# Patient Record
Sex: FEMALE | Race: BLACK OR AFRICAN AMERICAN | NOT HISPANIC OR LATINO | Employment: OTHER | URBAN - METROPOLITAN AREA
[De-identification: names, ages, dates, MRNs, and addresses within clinical notes are randomized per-mention and may not be internally consistent; named-entity substitution may affect disease eponyms.]

---

## 2017-01-26 ENCOUNTER — HOSPITAL ENCOUNTER (OUTPATIENT)
Dept: GENERAL RADIOLOGY | Facility: HOSPITAL | Age: 62
Discharge: HOME | End: 2017-01-26
Attending: FAMILY MEDICINE | Admitting: FAMILY MEDICINE

## 2017-01-26 ENCOUNTER — TRANSCRIBE ORDERS (OUTPATIENT)
Dept: ADMINISTRATIVE | Facility: HOSPITAL | Age: 62
End: 2017-01-26

## 2017-01-26 DIAGNOSIS — Z87.09 HISTORY OF PNEUMOTHORAX: Primary | ICD-10-CM

## 2017-01-26 DIAGNOSIS — Z87.09 HISTORY OF PNEUMOTHORAX: ICD-10-CM

## 2017-01-26 PROCEDURE — 71020 HC CHEST PA AND LATERAL: CPT

## 2017-04-25 ENCOUNTER — OFFICE VISIT (OUTPATIENT)
Dept: CARDIOLOGY | Facility: CLINIC | Age: 62
End: 2017-04-25

## 2017-04-25 VITALS
HEIGHT: 65 IN | DIASTOLIC BLOOD PRESSURE: 82 MMHG | BODY MASS INDEX: 26.59 KG/M2 | WEIGHT: 159.6 LBS | SYSTOLIC BLOOD PRESSURE: 142 MMHG | HEART RATE: 89 BPM

## 2017-04-25 DIAGNOSIS — Z95.0 PACEMAKER: Primary | ICD-10-CM

## 2017-04-25 PROCEDURE — 93288 INTERROG EVL PM/LDLS PM IP: CPT | Performed by: INTERNAL MEDICINE

## 2017-04-25 NOTE — PROGRESS NOTES
"              Electrophysiology PM Check         No current outpatient prescriptions on file.     /82 (BP Location: Right arm, Patient Position: Sitting)  Pulse 89  Ht 65\" (165.1 cm)  Wt 159 lb 9.6 oz (72.4 kg)  LMP  (LMP Unknown)  BMI 26.56 kg/m2.    Physical Exam   Pulmonary/Chest:              Company BTK  Mode DDD  Lower Rate 50 bpm  Upper rate 140 bpm       Thresholds  % pacing 0  Atrial Pacing 0.5 Volts @ 0.4 ms  Atrial Sensing 1.7 mV  Atrial Impedence 487 Ohms    % pacing 100  Right Ventricular Pacing 0.6 Volts @ 0.4 ms  Right Ventricular Sensing DEPENDENT mV  Right Ventricular Impedence 507 Ohms      Battery Voltage 75% Volts  Longevity 9.5y        Episodes NS AT    Reprogramming 0                           Comments       NORMAL FUNCTION, FOLLOW-UP 1 YEAR      "

## 2017-09-01 ENCOUNTER — TRANSCRIBE ORDERS (OUTPATIENT)
Dept: ADMINISTRATIVE | Facility: HOSPITAL | Age: 62
End: 2017-09-01

## 2017-09-01 DIAGNOSIS — Z12.31 VISIT FOR SCREENING MAMMOGRAM: Primary | ICD-10-CM

## 2017-09-05 ENCOUNTER — HOSPITAL ENCOUNTER (OUTPATIENT)
Dept: MAMMOGRAPHY | Facility: HOSPITAL | Age: 62
Discharge: HOME | End: 2017-09-05
Attending: FAMILY MEDICINE | Admitting: FAMILY MEDICINE

## 2017-09-05 DIAGNOSIS — Z12.31 VISIT FOR SCREENING MAMMOGRAM: ICD-10-CM

## 2017-09-05 PROCEDURE — 77063 BREAST TOMOSYNTHESIS BI: CPT | Performed by: RADIOLOGY

## 2017-09-05 PROCEDURE — 77067 SCR MAMMO BI INCL CAD: CPT | Performed by: RADIOLOGY

## 2017-09-05 PROCEDURE — 77063 BREAST TOMOSYNTHESIS BI: CPT

## 2017-09-05 PROCEDURE — G0202 SCR MAMMO BI INCL CAD: HCPCS

## 2018-02-12 ENCOUNTER — HOSPITAL ENCOUNTER (OUTPATIENT)
Dept: GENERAL RADIOLOGY | Facility: HOSPITAL | Age: 63
Discharge: HOME | End: 2018-02-12
Attending: FAMILY MEDICINE | Admitting: FAMILY MEDICINE

## 2018-02-12 ENCOUNTER — TRANSCRIBE ORDERS (OUTPATIENT)
Dept: ADMINISTRATIVE | Facility: HOSPITAL | Age: 63
End: 2018-02-12

## 2018-02-12 DIAGNOSIS — S93.401A MODERATE RIGHT ANKLE SPRAIN, INITIAL ENCOUNTER: ICD-10-CM

## 2018-02-12 DIAGNOSIS — S93.401A MODERATE RIGHT ANKLE SPRAIN, INITIAL ENCOUNTER: Primary | ICD-10-CM

## 2018-02-12 PROCEDURE — 73610 X-RAY EXAM OF ANKLE: CPT

## 2018-05-01 ENCOUNTER — CLINICAL SUPPORT NO REQUIREMENTS (OUTPATIENT)
Dept: CARDIOLOGY | Facility: CLINIC | Age: 63
End: 2018-05-01

## 2018-05-01 VITALS — SYSTOLIC BLOOD PRESSURE: 140 MMHG | WEIGHT: 158.2 LBS | DIASTOLIC BLOOD PRESSURE: 80 MMHG | BODY MASS INDEX: 26.33 KG/M2

## 2018-05-01 DIAGNOSIS — I44.2 COMPLETE HEART BLOCK (HCC): ICD-10-CM

## 2018-05-01 PROCEDURE — 93280 PM DEVICE PROGR EVAL DUAL: CPT | Performed by: INTERNAL MEDICINE

## 2018-10-15 ENCOUNTER — TRANSCRIBE ORDERS (OUTPATIENT)
Dept: ADMINISTRATIVE | Facility: HOSPITAL | Age: 63
End: 2018-10-15

## 2018-10-15 DIAGNOSIS — Z12.31 VISIT FOR SCREENING MAMMOGRAM: Primary | ICD-10-CM

## 2018-11-13 ENCOUNTER — HOSPITAL ENCOUNTER (OUTPATIENT)
Dept: MAMMOGRAPHY | Facility: HOSPITAL | Age: 63
Discharge: HOME | End: 2018-11-13
Attending: FAMILY MEDICINE | Admitting: FAMILY MEDICINE

## 2018-11-13 DIAGNOSIS — Z12.31 VISIT FOR SCREENING MAMMOGRAM: ICD-10-CM

## 2018-11-13 PROCEDURE — 77063 BREAST TOMOSYNTHESIS BI: CPT | Performed by: RADIOLOGY

## 2018-11-13 PROCEDURE — 77067 SCR MAMMO BI INCL CAD: CPT | Performed by: RADIOLOGY

## 2018-11-13 PROCEDURE — 77067 SCR MAMMO BI INCL CAD: CPT

## 2018-11-13 PROCEDURE — 77063 BREAST TOMOSYNTHESIS BI: CPT

## 2018-11-20 ENCOUNTER — OFFICE VISIT (OUTPATIENT)
Dept: CARDIOLOGY | Facility: CLINIC | Age: 63
End: 2018-11-20

## 2018-11-20 VITALS
DIASTOLIC BLOOD PRESSURE: 70 MMHG | SYSTOLIC BLOOD PRESSURE: 140 MMHG | BODY MASS INDEX: 25.66 KG/M2 | HEART RATE: 73 BPM | HEIGHT: 65 IN | WEIGHT: 154 LBS

## 2018-11-20 DIAGNOSIS — I44.2 COMPLETE HEART BLOCK (HCC): ICD-10-CM

## 2018-11-20 PROCEDURE — 93280 PM DEVICE PROGR EVAL DUAL: CPT | Performed by: PHYSICIAN ASSISTANT

## 2018-11-20 PROCEDURE — 99213 OFFICE O/P EST LOW 20 MIN: CPT | Performed by: PHYSICIAN ASSISTANT

## 2018-11-20 NOTE — PROGRESS NOTES
Ria Dumont  1955  PCP: NEELIMA Palomo MD    SUBJECTIVE:   Ria Dumont is a 62 y.o. female seen for a follow up visit regarding the following:     Chief Complaint: Follow up for complete heart block, pacemaker check, htn    HPI:    Since last visit the patient's status has been stable. Her BP has been well controlled off medications. She does note she has felt brief palpitations very sporadically. No chest pain, sob, edema, syncope.     History:     Patient is a 62 year old female with a history of complete heart block s/p dual chamber pacemaker that is a former patient of Dr. Porter's.     Cardiac PMH: (Old records have been reviewed and summarized below)    1. Complete Heart Block   2. BTK Dual Chamber PM   3. HTN     No current outpatient medications on file.    Past Medical History, Past Surgical History, Family history, Social History, and Medications were all reviewed with the patient today and updated as necessary.       Patient Active Problem List   Diagnosis   • Complete heart block (CMS/HCC)   • Spontaneous pneumothorax   • Hypertension     Allergies   Allergen Reactions   • Doxycycline      Past Medical History:   Diagnosis Date   • Complete heart block (CMS/HCC)    • Hypertension    • Spontaneous pneumothorax 1985    Remote Hx. S/P chest tube      Past Surgical History:   Procedure Laterality Date   • PACEMAKER IMPLANTATION       Family History   Problem Relation Age of Onset   • Breast cancer Maternal Aunt 68   • BRCA 1/2 Neg Hx    • Colon cancer Neg Hx    • Endometrial cancer Neg Hx    • Ovarian cancer Neg Hx      Social History     Tobacco Use   • Smoking status: Former Smoker     Types: Cigarettes     Last attempt to quit: 11/20/2003     Years since quitting: 15.0   • Smokeless tobacco: Never Used   • Tobacco comment: y-year   Substance Use Topics   • Alcohol use: No         PHYSICAL EXAM:    /70 (BP Location: Left arm, Patient Position: Sitting)   Pulse 73   Ht 165.1 cm  "(65\")   Wt 69.9 kg (154 lb)   LMP  (LMP Unknown)   BMI 25.63 kg/m²        Wt Readings from Last 5 Encounters:   11/20/18 69.9 kg (154 lb)   05/01/18 71.8 kg (158 lb 3.2 oz)   04/25/17 72.4 kg (159 lb 9.6 oz)       BP Readings from Last 5 Encounters:   11/20/18 140/70   05/01/18 140/80   04/25/17 142/82       General-Well Nourished, Well developed  Eyes - PERRLA  Neck- supple, No mass  CV- regular rate and rhythm, no MRG, No edema  Lung- clear bilaterally  Abd- soft, +BS  Musc/skel - Norm strength and range of motion  Skin- warm and dry  Neuro - Alert & Oriented x 3, appropriate mood.        Medical problems and test results were reviewed with the patient today.     No results found for this or any previous visit (from the past 672 hour(s)).      EKG: (EKG has been independently visualized by me and summarized below)    Procedures     Device Interrogation: biotronik dual chamber pacemaker. A paced 0% and V paced 100%. Stable thresholds and impedences. Battery 65%; Longest mode switch 11 minutes- aflutter.     ASSESSMENT and PLAN    1. Bradycardia/Complete Heart Block s/p BTK dual chamber pacemaker with normal function. Continue current meds. Patient had several mode switches with longest being 11 minutes and appears to be aflutter. Will arrange for home monitoring today and if burden increases will start medical therapy. I have instructed patient to start ASA 81mg daily.     2. HTN- controlled off medications. Continue to monitor.         Return in about 1 year (around 11/20/2019).        Breonna De Paz PA-C  Cardiology/Electrophysiology  11/20/2018  9:21 AM  "

## 2018-12-13 ENCOUNTER — TELEPHONE (OUTPATIENT)
Dept: CARDIOLOGY | Facility: CLINIC | Age: 63
End: 2018-12-13

## 2018-12-13 NOTE — TELEPHONE ENCOUNTER
Called pt to see if she received her [employer] home monitor.  She has received it and she hasn't set it up yet.  She will call once she does to make sure it is reading.

## 2019-02-13 ENCOUNTER — TELEPHONE (OUTPATIENT)
Dept: CARDIOLOGY | Facility: CLINIC | Age: 64
End: 2019-02-13

## 2019-02-13 NOTE — TELEPHONE ENCOUNTER
Called pt to check to see if she has plugged her [employer] home monitor up.  She said she plans on doing so this week.

## 2019-10-18 ENCOUNTER — TRANSCRIBE ORDERS (OUTPATIENT)
Dept: ADMINISTRATIVE | Facility: HOSPITAL | Age: 64
End: 2019-10-18

## 2019-10-18 ENCOUNTER — HOSPITAL ENCOUNTER (OUTPATIENT)
Dept: GENERAL RADIOLOGY | Facility: HOSPITAL | Age: 64
Discharge: HOME | End: 2019-10-18
Admitting: FAMILY MEDICINE

## 2019-10-18 DIAGNOSIS — Z12.31 VISIT FOR SCREENING MAMMOGRAM: Primary | ICD-10-CM

## 2019-10-18 DIAGNOSIS — R07.9 CHEST PAIN, UNSPECIFIED TYPE: ICD-10-CM

## 2019-10-18 DIAGNOSIS — R07.9 CHEST PAIN, UNSPECIFIED TYPE: Primary | ICD-10-CM

## 2019-10-18 PROCEDURE — 71046 X-RAY EXAM CHEST 2 VIEWS: CPT

## 2019-11-26 ENCOUNTER — OFFICE VISIT (OUTPATIENT)
Dept: CARDIOLOGY | Facility: CLINIC | Age: 64
End: 2019-11-26

## 2019-11-26 VITALS
BODY MASS INDEX: 25.69 KG/M2 | HEIGHT: 65 IN | WEIGHT: 154.2 LBS | DIASTOLIC BLOOD PRESSURE: 60 MMHG | HEART RATE: 67 BPM | OXYGEN SATURATION: 100 % | SYSTOLIC BLOOD PRESSURE: 118 MMHG

## 2019-11-26 DIAGNOSIS — I44.2 COMPLETE HEART BLOCK (HCC): Primary | ICD-10-CM

## 2019-11-26 PROCEDURE — 99213 OFFICE O/P EST LOW 20 MIN: CPT | Performed by: PHYSICIAN ASSISTANT

## 2019-11-26 PROCEDURE — 93280 PM DEVICE PROGR EVAL DUAL: CPT | Performed by: PHYSICIAN ASSISTANT

## 2019-11-26 NOTE — PROGRESS NOTES
Ria Dumont  1955  PCP: NEELIMA Palomo MD    SUBJECTIVE:   Ria Dumont is a 63 y.o. female seen for a follow up visit regarding the following:     Chief Complaint: Follow up for CHB, pacemaker check     HPI:    Since last visit the patient's status has been stable. She has no complaints today. No cardiovascular issues. No cp, sob, edema.     History:      Patient is a 62 year old female with a history of complete heart block s/p dual chamber pacemaker that is a former patient of Dr. Porter's.      Cardiac PMH: (Old records have been reviewed and summarized below)     1. Complete Heart Block   2. BTK Dual Chamber PM   3. HTN       Current Outpatient Medications:   •  calcium citrate-vitamin d (CITRACAL) 200-250 MG-UNIT tablet tablet, Take 1 tablet by mouth Daily., Disp: , Rfl:     Past Medical History, Past Surgical History, Family history, Social History, and Medications were all reviewed with the patient today and updated as necessary.       Patient Active Problem List   Diagnosis   • Complete heart block (CMS/HCC)   • Spontaneous pneumothorax   • Hypertension     Allergies   Allergen Reactions   • Doxycycline      Past Medical History:   Diagnosis Date   • Complete heart block (CMS/HCC)    • Hypertension    • Osteopetrosis    • Spontaneous pneumothorax     Remote Hx. S/P chest tube      Past Surgical History:   Procedure Laterality Date   • PACEMAKER IMPLANTATION       Family History   Problem Relation Age of Onset   • Breast cancer Maternal Aunt 68   • BRCA 1/2 Neg Hx    • Colon cancer Neg Hx    • Endometrial cancer Neg Hx    • Ovarian cancer Neg Hx      Social History     Tobacco Use   • Smoking status: Former Smoker     Types: Cigarettes     Last attempt to quit: 2003     Years since quittin.0   • Smokeless tobacco: Never Used   • Tobacco comment: y-year   Substance Use Topics   • Alcohol use: No         PHYSICAL EXAM:    /60 (BP Location: Right arm, Patient Position:  "Sitting)   Pulse 67   Ht 165.1 cm (65\")   Wt 69.9 kg (154 lb 3.2 oz)   LMP  (LMP Unknown)   SpO2 100%   BMI 25.66 kg/m²        Wt Readings from Last 5 Encounters:   11/26/19 69.9 kg (154 lb 3.2 oz)   11/20/18 69.9 kg (154 lb)   05/01/18 71.8 kg (158 lb 3.2 oz)   04/25/17 72.4 kg (159 lb 9.6 oz)       BP Readings from Last 5 Encounters:   11/26/19 118/60   11/20/18 140/70   05/01/18 140/80   04/25/17 142/82       General-Well Nourished, Well developed  Eyes - PERRLA  Neck- supple, No mass  CV- regular rate and rhythm, no MRG, No edema  Lung- clear bilaterally  Abd- soft, +BS  Musc/skel - Norm strength and range of motion  Skin- warm and dry  Neuro - Alert & Oriented x 3, appropriate mood.        Medical problems and test results were reviewed with the patient today.     No results found for this or any previous visit (from the past 672 hour(s)).      EKG: (EKG has been independently visualized by me and summarized below)    Procedures     ASSESSMENT and PLAN    1. Bradycardia/Complete Heart Block s/p BTK dual chamber pacemaker with normal function. Continue current meds. Patient had a few mode switches appears to be atach-longest 5 minutes. Advised patient to set up home monitor and if burden increases will start medical therapy. I have instructed patient to start ASA 81mg daily.      2. HTN- controlled off medications. Continue to monitor.       Return in about 1 year (around 11/26/2020) for BTK.        Breonna De Paz PA-C   Cardiology/Electrophysiology  11/26/2019  10:04 AM  "

## 2019-12-30 ENCOUNTER — APPOINTMENT (OUTPATIENT)
Dept: MAMMOGRAPHY | Facility: HOSPITAL | Age: 64
End: 2019-12-30

## 2020-01-21 ENCOUNTER — CLINICAL SUPPORT NO REQUIREMENTS (OUTPATIENT)
Dept: CARDIOLOGY | Facility: CLINIC | Age: 65
End: 2020-01-21

## 2020-01-21 DIAGNOSIS — I44.2 COMPLETE HEART BLOCK (HCC): ICD-10-CM

## 2020-01-21 PROCEDURE — 93296 REM INTERROG EVL PM/IDS: CPT | Performed by: INTERNAL MEDICINE

## 2020-01-21 PROCEDURE — 93294 REM INTERROG EVL PM/LDLS PM: CPT | Performed by: INTERNAL MEDICINE

## 2020-02-27 ENCOUNTER — HOSPITAL ENCOUNTER (OUTPATIENT)
Dept: MAMMOGRAPHY | Facility: HOSPITAL | Age: 65
Discharge: HOME | End: 2020-02-27
Admitting: FAMILY MEDICINE

## 2020-02-27 DIAGNOSIS — Z12.31 VISIT FOR SCREENING MAMMOGRAM: ICD-10-CM

## 2020-02-27 PROCEDURE — 77067 SCR MAMMO BI INCL CAD: CPT

## 2020-02-27 PROCEDURE — 77067 SCR MAMMO BI INCL CAD: CPT | Performed by: RADIOLOGY

## 2020-02-27 PROCEDURE — 77063 BREAST TOMOSYNTHESIS BI: CPT | Performed by: RADIOLOGY

## 2020-02-27 PROCEDURE — 77063 BREAST TOMOSYNTHESIS BI: CPT

## 2020-05-05 ENCOUNTER — OFFICE VISIT (OUTPATIENT)
Dept: CARDIOLOGY | Facility: CLINIC | Age: 65
End: 2020-05-05

## 2020-05-05 ENCOUNTER — LAB (OUTPATIENT)
Dept: LAB | Facility: HOSPITAL | Age: 65
End: 2020-05-05

## 2020-05-05 VITALS
WEIGHT: 151 LBS | BODY MASS INDEX: 25.16 KG/M2 | HEART RATE: 128 BPM | DIASTOLIC BLOOD PRESSURE: 90 MMHG | HEIGHT: 65 IN | SYSTOLIC BLOOD PRESSURE: 164 MMHG

## 2020-05-05 DIAGNOSIS — I47.1 ATRIAL TACHYCARDIA (HCC): ICD-10-CM

## 2020-05-05 DIAGNOSIS — I44.2 COMPLETE HEART BLOCK (HCC): Primary | ICD-10-CM

## 2020-05-05 DIAGNOSIS — R00.2 PALPITATIONS: ICD-10-CM

## 2020-05-05 DIAGNOSIS — I10 ESSENTIAL HYPERTENSION: ICD-10-CM

## 2020-05-05 PROBLEM — I47.19 ATRIAL TACHYCARDIA: Status: ACTIVE | Noted: 2020-05-05

## 2020-05-05 LAB
ALBUMIN SERPL-MCNC: 4.6 G/DL (ref 3.5–5.2)
ALBUMIN/GLOB SERPL: 1.4 G/DL
ALP SERPL-CCNC: 78 U/L (ref 39–117)
ALT SERPL W P-5'-P-CCNC: 9 U/L (ref 1–33)
ANION GAP SERPL CALCULATED.3IONS-SCNC: 12.4 MMOL/L (ref 5–15)
AST SERPL-CCNC: 12 U/L (ref 1–32)
BASOPHILS # BLD AUTO: 0.02 10*3/MM3 (ref 0–0.2)
BASOPHILS NFR BLD AUTO: 0.5 % (ref 0–1.5)
BILIRUB SERPL-MCNC: 0.3 MG/DL (ref 0.2–1.2)
BUN BLD-MCNC: 13 MG/DL (ref 8–23)
BUN/CREAT SERPL: 17.3 (ref 7–25)
CALCIUM SPEC-SCNC: 9.7 MG/DL (ref 8.6–10.5)
CHLORIDE SERPL-SCNC: 104 MMOL/L (ref 98–107)
CO2 SERPL-SCNC: 27.6 MMOL/L (ref 22–29)
CREAT BLD-MCNC: 0.75 MG/DL (ref 0.57–1)
DEPRECATED RDW RBC AUTO: 35 FL (ref 37–54)
EOSINOPHIL # BLD AUTO: 0.01 10*3/MM3 (ref 0–0.4)
EOSINOPHIL NFR BLD AUTO: 0.2 % (ref 0.3–6.2)
ERYTHROCYTE [DISTWIDTH] IN BLOOD BY AUTOMATED COUNT: 12.5 % (ref 12.3–15.4)
GFR SERPL CREATININE-BSD FRML MDRD: 94 ML/MIN/1.73
GLOBULIN UR ELPH-MCNC: 3.2 GM/DL
GLUCOSE BLD-MCNC: 91 MG/DL (ref 65–99)
HCT VFR BLD AUTO: 38.2 % (ref 34–46.6)
HGB BLD-MCNC: 12.4 G/DL (ref 12–15.9)
IMM GRANULOCYTES # BLD AUTO: 0.02 10*3/MM3 (ref 0–0.05)
IMM GRANULOCYTES NFR BLD AUTO: 0.5 % (ref 0–0.5)
LYMPHOCYTES # BLD AUTO: 0.81 10*3/MM3 (ref 0.7–3.1)
LYMPHOCYTES NFR BLD AUTO: 18.5 % (ref 19.6–45.3)
MAGNESIUM SERPL-MCNC: 2.3 MG/DL (ref 1.6–2.4)
MCH RBC QN AUTO: 25.6 PG (ref 26.6–33)
MCHC RBC AUTO-ENTMCNC: 32.5 G/DL (ref 31.5–35.7)
MCV RBC AUTO: 78.9 FL (ref 79–97)
MONOCYTES # BLD AUTO: 0.38 10*3/MM3 (ref 0.1–0.9)
MONOCYTES NFR BLD AUTO: 8.7 % (ref 5–12)
NEUTROPHILS # BLD AUTO: 3.15 10*3/MM3 (ref 1.7–7)
NEUTROPHILS NFR BLD AUTO: 71.6 % (ref 42.7–76)
NRBC BLD AUTO-RTO: 0 /100 WBC (ref 0–0.2)
PLATELET # BLD AUTO: 293 10*3/MM3 (ref 140–450)
PMV BLD AUTO: 10.4 FL (ref 6–12)
POTASSIUM BLD-SCNC: 4.3 MMOL/L (ref 3.5–5.2)
PROT SERPL-MCNC: 7.8 G/DL (ref 6–8.5)
RBC # BLD AUTO: 4.84 10*6/MM3 (ref 3.77–5.28)
SODIUM BLD-SCNC: 144 MMOL/L (ref 136–145)
T4 FREE SERPL-MCNC: 1.58 NG/DL (ref 0.93–1.7)
TSH SERPL DL<=0.05 MIU/L-ACNC: 3.86 UIU/ML (ref 0.27–4.2)
WBC NRBC COR # BLD: 4.39 10*3/MM3 (ref 3.4–10.8)

## 2020-05-05 PROCEDURE — 93280 PM DEVICE PROGR EVAL DUAL: CPT | Performed by: PHYSICIAN ASSISTANT

## 2020-05-05 PROCEDURE — 80053 COMPREHEN METABOLIC PANEL: CPT

## 2020-05-05 PROCEDURE — 85025 COMPLETE CBC W/AUTO DIFF WBC: CPT

## 2020-05-05 PROCEDURE — 84439 ASSAY OF FREE THYROXINE: CPT

## 2020-05-05 PROCEDURE — 83735 ASSAY OF MAGNESIUM: CPT

## 2020-05-05 PROCEDURE — 36415 COLL VENOUS BLD VENIPUNCTURE: CPT

## 2020-05-05 PROCEDURE — 99214 OFFICE O/P EST MOD 30 MIN: CPT | Performed by: PHYSICIAN ASSISTANT

## 2020-05-05 PROCEDURE — 84443 ASSAY THYROID STIM HORMONE: CPT

## 2020-05-05 RX ORDER — AMLODIPINE BESYLATE 5 MG/1
5 TABLET ORAL DAILY
Qty: 30 TABLET | Refills: 11 | Status: SHIPPED | OUTPATIENT
Start: 2020-05-05 | End: 2020-08-04 | Stop reason: SDUPTHER

## 2020-05-05 NOTE — PROGRESS NOTES
Ria Dumont  1955  PCP: NEELIMA Palomo MD    SUBJECTIVE:   Ria Dumont is a 64 y.o. female seen for a follow up visit regarding the following:     Chief Complaint: Follow up for HTN     HPI:    Patient presents today at the request of her PCP for HTN and tachycardia. She was seen in the office this AM by her PCP and was noted to have elevated BPs with systolic readings in the 160s. She was then directed to present here for further evaluation of her HTN and tachycardia. She has not previously been on medications as she was controlling it with lifestyle modifications. She does not feel her heart racing. On presentation her HR was in the 130s and on device interrogation appeared to be an atrial tachycardia. After she settled down, her rates dropped into the 90s. She was asymptomatic and denies feeling any palpitations. She has not had any chest pain, pressure, sob, edema, dizziness. No recent infection, cough, fever.     History:      Patient is a 62 year old female with a history of complete heart block s/p dual chamber pacemaker that is a former patient of Dr. Porter's.      Cardiac PMH: (Old records have been reviewed and summarized below)     1. Complete Heart Block   2. BTK Dual Chamber PM   3. HTN       Current Outpatient Medications:   •  calcium citrate-vitamin d (CITRACAL) 200-250 MG-UNIT tablet tablet, Take 1 tablet by mouth Daily., Disp: , Rfl:   •  amLODIPine (NORVASC) 5 MG tablet, Take 1 tablet by mouth Daily., Disp: 30 tablet, Rfl: 11  •  metoprolol tartrate (LOPRESSOR) 25 MG tablet, Take 1 tablet by mouth 2 (Two) Times a Day., Disp: 60 tablet, Rfl: 11    Past Medical History, Past Surgical History, Family history, Social History, and Medications were all reviewed with the patient today and updated as necessary.       Patient Active Problem List   Diagnosis   • Complete heart block (CMS/HCC)   • Spontaneous pneumothorax   • Hypertension   • Atrial tachycardia (CMS/HCC)     Allergies  "  Allergen Reactions   • Doxycycline      Past Medical History:   Diagnosis Date   • Complete heart block (CMS/HCC)    • Hypertension    • Osteopetrosis    • Spontaneous pneumothorax 1985    Remote Hx. S/P chest tube      Past Surgical History:   Procedure Laterality Date   • PACEMAKER IMPLANTATION       Family History   Problem Relation Age of Onset   • Breast cancer Maternal Aunt 68   • BRCA 1/2 Neg Hx    • Colon cancer Neg Hx    • Endometrial cancer Neg Hx    • Ovarian cancer Neg Hx      Social History     Tobacco Use   • Smoking status: Former Smoker     Types: Cigarettes     Last attempt to quit: 2003     Years since quittin.4   • Smokeless tobacco: Never Used   • Tobacco comment: y-year   Substance Use Topics   • Alcohol use: No         PHYSICAL EXAM:    /90 (BP Location: Left arm, Patient Position: Sitting)   Pulse (!) 128   Ht 165.1 cm (65\")   Wt 68.5 kg (151 lb)   LMP  (LMP Unknown)   BMI 25.13 kg/m²        Wt Readings from Last 5 Encounters:   20 68.5 kg (151 lb)   19 69.9 kg (154 lb 3.2 oz)   18 69.9 kg (154 lb)   18 71.8 kg (158 lb 3.2 oz)   17 72.4 kg (159 lb 9.6 oz)       BP Readings from Last 5 Encounters:   20 164/90   19 118/60   18 140/70   18 140/80   17 142/82       General-Well Nourished, Well developed  Eyes - PERRLA  Neck- supple, No mass  CV- regular rate and rhythm, no MRG, No edema  Lung- clear bilaterally  Abd- soft, +BS  Musc/skel - Norm strength and range of motion  Skin- warm and dry  Neuro - Alert & Oriented x 3, appropriate mood.        Medical problems and test results were reviewed with the patient today.     No results found for this or any previous visit (from the past 672 hour(s)).      EKG: (EKG has been independently visualized by me and summarized below)      ECG 12 Lead  Date/Time: 2020 3:04 PM  Performed by: Breonna De Paz PA  Authorized by: Breonna De Paz PA   Rhythm: paced  Rate: " tachycardic  BPM: 128  QRS axis: normal    Clinical impression: abnormal EKG             ASSESSMENT and PLAN    1. Bradycardia/Complete Heart Block s/p BTK dual chamber pacemaker with normal function. She has not had any previous elevated rates prior to this morning. Initially appeared to be in an atrial tachycardia. No afib or aflutter.       2. HTN- previously controlled off meds. Being seen today as a work in per PCP for elevated BP and tachycardia. Will start Amlodipine 5mg daily and Lopressor 25mg BID. She will purchase a BP cuff on the way home.     3. Tachycardia- patient was in an atrial tachycardia initially on presentation which all appeared to begin this morning around 9am. Her rates began to improve and it then appeared to be a sinus tachycardia with rates around 100bpm per device interrogation which is odd given her history of complete heart block. She is asymptomatic and has not had any atrial fibrillation or flutter. I will order labs CBC, TSH, T4, CMP, and Mag to rule out organic cause of tachycardia. Will also order echocardiogram. She will start Lopressor 25mg BID. I will call her on Thursday to check in.       Return in about 4 weeks (around 6/2/2020) for BTK.    Breonna De Paz PA-C   Cardiology/Electrophysiology  5/5/2020  15:05

## 2020-05-20 ENCOUNTER — HOSPITAL ENCOUNTER (OUTPATIENT)
Dept: CARDIOLOGY | Facility: HOSPITAL | Age: 65
Discharge: HOME | End: 2020-05-20
Admitting: PHYSICIAN ASSISTANT

## 2020-05-20 VITALS — WEIGHT: 151 LBS | HEIGHT: 65 IN | BODY MASS INDEX: 25.16 KG/M2

## 2020-05-20 DIAGNOSIS — I44.2 COMPLETE HEART BLOCK (HCC): ICD-10-CM

## 2020-05-20 DIAGNOSIS — I47.1 ATRIAL TACHYCARDIA (HCC): ICD-10-CM

## 2020-05-20 DIAGNOSIS — I10 ESSENTIAL HYPERTENSION: ICD-10-CM

## 2020-05-20 PROCEDURE — 93306 TTE W/DOPPLER COMPLETE: CPT | Performed by: INTERNAL MEDICINE

## 2020-05-20 PROCEDURE — 93306 TTE W/DOPPLER COMPLETE: CPT

## 2020-05-21 LAB
BH CV ECHO MEAS - AO ROOT AREA (BSA CORRECTED): 1.6
BH CV ECHO MEAS - AO ROOT AREA: 6.2 CM^2
BH CV ECHO MEAS - AO ROOT DIAM: 2.8 CM
BH CV ECHO MEAS - BSA(HAYCOCK): 1.8 M^2
BH CV ECHO MEAS - BSA: 1.8 M^2
BH CV ECHO MEAS - BZI_BMI: 25.1 KILOGRAMS/M^2
BH CV ECHO MEAS - BZI_METRIC_HEIGHT: 165.1 CM
BH CV ECHO MEAS - BZI_METRIC_WEIGHT: 68.5 KG
BH CV ECHO MEAS - EDV(CUBED): 67.4 ML
BH CV ECHO MEAS - EDV(MOD-SP2): 58 ML
BH CV ECHO MEAS - EDV(MOD-SP4): 59 ML
BH CV ECHO MEAS - EDV(TEICH): 72.9 ML
BH CV ECHO MEAS - EF(CUBED): 72.7 %
BH CV ECHO MEAS - EF(MOD-BP): 68 %
BH CV ECHO MEAS - EF(MOD-SP2): 67.2 %
BH CV ECHO MEAS - EF(MOD-SP4): 66.1 %
BH CV ECHO MEAS - EF(TEICH): 65 %
BH CV ECHO MEAS - ESV(CUBED): 18.4 ML
BH CV ECHO MEAS - ESV(MOD-SP2): 19 ML
BH CV ECHO MEAS - ESV(MOD-SP4): 20 ML
BH CV ECHO MEAS - ESV(TEICH): 25.6 ML
BH CV ECHO MEAS - FS: 35.1 %
BH CV ECHO MEAS - IVS/LVPW: 1.1
BH CV ECHO MEAS - IVSD: 1.1 CM
BH CV ECHO MEAS - LA DIMENSION: 2.9 CM
BH CV ECHO MEAS - LA/AO: 1
BH CV ECHO MEAS - LAD MAJOR: 4 CM
BH CV ECHO MEAS - LAT PEAK E' VEL: 12.2 CM/SEC
BH CV ECHO MEAS - LATERAL E/E' RATIO: 7.7
BH CV ECHO MEAS - LV DIASTOLIC VOL/BSA (35-75): 33.6 ML/M^2
BH CV ECHO MEAS - LV MASS(C)D: 136.6 GRAMS
BH CV ECHO MEAS - LV MASS(C)DI: 77.8 GRAMS/M^2
BH CV ECHO MEAS - LV SYSTOLIC VOL/BSA (12-30): 11.4 ML/M^2
BH CV ECHO MEAS - LVIDD: 4.1 CM
BH CV ECHO MEAS - LVIDS: 2.6 CM
BH CV ECHO MEAS - LVLD AP2: 6.7 CM
BH CV ECHO MEAS - LVLD AP4: 6.8 CM
BH CV ECHO MEAS - LVLS AP2: 5.8 CM
BH CV ECHO MEAS - LVLS AP4: 5.9 CM
BH CV ECHO MEAS - LVPWD: 0.98 CM
BH CV ECHO MEAS - MED PEAK E' VEL: 11.4 CM/SEC
BH CV ECHO MEAS - MEDIAL E/E' RATIO: 8.2
BH CV ECHO MEAS - MV DEC TIME: 0.1 SEC
BH CV ECHO MEAS - MV E MAX VEL: 93.6 CM/SEC
BH CV ECHO MEAS - PA ACC SLOPE: 1159 CM/SEC^2
BH CV ECHO MEAS - PA ACC TIME: 0.11 SEC
BH CV ECHO MEAS - PA PR(ACCEL): 30 MMHG
BH CV ECHO MEAS - RAP SYSTOLE: 8 MMHG
BH CV ECHO MEAS - RVDD: 2.5 CM
BH CV ECHO MEAS - RVSP: 34 MMHG
BH CV ECHO MEAS - SI(CUBED): 27.9 ML/M^2
BH CV ECHO MEAS - SI(MOD-SP2): 22.2 ML/M^2
BH CV ECHO MEAS - SI(MOD-SP4): 22.2 ML/M^2
BH CV ECHO MEAS - SI(TEICH): 27 ML/M^2
BH CV ECHO MEAS - SV(CUBED): 49 ML
BH CV ECHO MEAS - SV(MOD-SP2): 39 ML
BH CV ECHO MEAS - SV(MOD-SP4): 39 ML
BH CV ECHO MEAS - SV(TEICH): 47.4 ML
BH CV ECHO MEAS - TAPSE (>1.6): 1.7 CM2
BH CV ECHO MEAS - TR MAX PG: 26 MMHG
BH CV ECHO MEAS - TR MAX VEL: 255 CM/SEC
BH CV ECHO MEASUREMENTS AVERAGE E/E' RATIO: 7.93
BH CV VAS BP LEFT ARM: NORMAL MMHG
BH CV XLRA - RV BASE: 3.2 CM
BH CV XLRA - RV LENGTH: 6.4 CM
BH CV XLRA - RV MID: 2 CM
BH CV XLRA - TDI S': 14.1 CM/SEC
LEFT ATRIUM VOLUME INDEX: 13.7 ML/M^2
LEFT ATRIUM VOLUME: 24 ML

## 2020-07-06 ENCOUNTER — OFFICE VISIT (OUTPATIENT)
Dept: CARDIOLOGY | Facility: CLINIC | Age: 65
End: 2020-07-06

## 2020-07-06 VITALS
HEIGHT: 65 IN | WEIGHT: 150 LBS | DIASTOLIC BLOOD PRESSURE: 76 MMHG | BODY MASS INDEX: 24.99 KG/M2 | HEART RATE: 70 BPM | SYSTOLIC BLOOD PRESSURE: 158 MMHG

## 2020-07-06 DIAGNOSIS — I44.2 COMPLETE HEART BLOCK (HCC): Primary | ICD-10-CM

## 2020-07-06 PROCEDURE — 99213 OFFICE O/P EST LOW 20 MIN: CPT | Performed by: PHYSICIAN ASSISTANT

## 2020-07-06 PROCEDURE — 93280 PM DEVICE PROGR EVAL DUAL: CPT | Performed by: PHYSICIAN ASSISTANT

## 2020-07-06 NOTE — PROGRESS NOTES
"    Ria Dumont  1955  PCP: NEELIMA Palomo MD    SUBJECTIVE:   Ria Dumont is a 64 y.o. female seen for a follow up visit regarding the following:     Chief Complaint: Follow up for CHB     HPI:    Since last visit the patient's status has been stable. She has not had any sustained tachycardia. She notes feeling brief episodes of \"an anxious feeling\" that only lasts a few seconds and then passes. She denies any exertional chest pain, gutierrez, sob, edema. BP has been elevated.     History:      Patient is a 62 year old female with a history of complete heart block s/p dual chamber pacemaker that is a former patient of Dr. Porter's.      Cardiac PMH: (Old records have been reviewed and summarized below)     1. Complete Heart Block   2. BTK Dual Chamber PM   3. HTN       Current Outpatient Medications:   •  amLODIPine (NORVASC) 5 MG tablet, Take 1 tablet by mouth Daily., Disp: 30 tablet, Rfl: 11  •  calcium citrate-vitamin d (CITRACAL) 200-250 MG-UNIT tablet tablet, Take 1 tablet by mouth Daily., Disp: , Rfl:     Past Medical History, Past Surgical History, Family history, Social History, and Medications were all reviewed with the patient today and updated as necessary.       Patient Active Problem List   Diagnosis   • Complete heart block (CMS/HCC)   • Spontaneous pneumothorax   • Hypertension   • Atrial tachycardia (CMS/HCC)     Allergies   Allergen Reactions   • Doxycycline Rash     Past Medical History:   Diagnosis Date   • Complete heart block (CMS/HCC)    • Hypertension    • Osteopetrosis    • Spontaneous pneumothorax 1985    Remote Hx. S/P chest tube      Past Surgical History:   Procedure Laterality Date   • PACEMAKER IMPLANTATION       Family History   Problem Relation Age of Onset   • Breast cancer Maternal Aunt 68   • BRCA 1/2 Neg Hx    • Colon cancer Neg Hx    • Endometrial cancer Neg Hx    • Ovarian cancer Neg Hx      Social History     Tobacco Use   • Smoking status: Former Smoker     Types: " "Cigarettes     Last attempt to quit: 2003     Years since quittin.6   • Smokeless tobacco: Never Used   • Tobacco comment: y-year   Substance Use Topics   • Alcohol use: No         PHYSICAL EXAM:    /76 (BP Location: Left arm, Patient Position: Sitting)   Pulse 70   Ht 165.1 cm (65\")   Wt 68 kg (150 lb)   LMP  (LMP Unknown)   BMI 24.96 kg/m²        Wt Readings from Last 5 Encounters:   20 68 kg (150 lb)   20 68.5 kg (151 lb)   20 68.5 kg (151 lb)   19 69.9 kg (154 lb 3.2 oz)   18 69.9 kg (154 lb)       BP Readings from Last 5 Encounters:   20 158/76   20 164/90   19 118/60   18 140/70   18 140/80       General-Well Nourished, Well developed  Eyes - PERRLA  Neck- supple, No mass  CV- regular rate and rhythm, no MRG, No edema  Lung- clear bilaterally  Abd- soft, +BS  Musc/skel - Norm strength and range of motion  Skin- warm and dry  Neuro - Alert & Oriented x 3, appropriate mood.        Medical problems and test results were reviewed with the patient today.     No results found for this or any previous visit (from the past 672 hour(s)).      EKG: (EKG has been independently visualized by me and summarized below)    Procedures     ASSESSMENT and PLAN    1. Bradycardia/Complete Heart Block s/p BTK dual chamber pacemaker with normal function. 33 mode switches with total time 3 minutes. Appears to be an atrial flutter that lasts a few seconds at a time. Battery 55%.      2. HTN- previously controlled off meds. Started on Amlodipine 5mg daily at last visit and elevated today. Will increase Amlodipine to 7.5mg daily.      3. Tachycardia- patient presented in what appeared to be an atrial tachycardia at last visit that resolved spontaneously.She is asymptomatic. Device check today revealed 33 mode switches totaling 3 minutes-it appeared to be atrial flutter. Will continue to monitor for now and if increased burden, will plan to start NOAC. For " now, will start daily ASA. All blood work returned within normal limits and echocardiogram revealed normal LVEF following last visit. She was started on Lopressor 25mg BID but discontinued due to side effects.       Return in about 3 months (around 10/6/2020) for BTK.        Breonna De Paz PA-C   Cardiology/Electrophysiology  7/6/2020  10:27

## 2020-08-04 RX ORDER — AMLODIPINE BESYLATE 5 MG/1
7.5 TABLET ORAL DAILY
Qty: 45 TABLET | Refills: 6 | Status: SHIPPED | OUTPATIENT
Start: 2020-08-04 | End: 2021-06-29

## 2020-12-01 ENCOUNTER — OFFICE VISIT (OUTPATIENT)
Dept: CARDIOLOGY | Facility: CLINIC | Age: 65
End: 2020-12-01

## 2020-12-01 VITALS
BODY MASS INDEX: 24.99 KG/M2 | WEIGHT: 150 LBS | OXYGEN SATURATION: 98 % | DIASTOLIC BLOOD PRESSURE: 86 MMHG | HEART RATE: 118 BPM | HEIGHT: 65 IN | SYSTOLIC BLOOD PRESSURE: 138 MMHG

## 2020-12-01 DIAGNOSIS — I47.1 ATRIAL TACHYCARDIA (HCC): Primary | ICD-10-CM

## 2020-12-01 DIAGNOSIS — I44.2 COMPLETE HEART BLOCK (HCC): ICD-10-CM

## 2020-12-01 DIAGNOSIS — I10 ESSENTIAL HYPERTENSION: ICD-10-CM

## 2020-12-01 PROCEDURE — 93280 PM DEVICE PROGR EVAL DUAL: CPT | Performed by: PHYSICIAN ASSISTANT

## 2020-12-01 PROCEDURE — 99213 OFFICE O/P EST LOW 20 MIN: CPT | Performed by: PHYSICIAN ASSISTANT

## 2020-12-01 RX ORDER — ASPIRIN 81 MG/1
81 TABLET ORAL DAILY
COMMUNITY

## 2020-12-01 NOTE — PROGRESS NOTES
Ria Dumont  1955  PCP: NEELIMA Palomo MD    SUBJECTIVE:   Ria Dumont is a 64 y.o. female seen for a follow up visit regarding the following:     Chief Complaint: Follow up for CHB, Pacemaker check     HPI:    Since last visit the patient's status has been stable. She has not had any recurrent tachycardia. She denies any exertional chest pain/pressure, sob/gutierrez, edema, palps. Increasing amlodipine has helped her BP.     History:      Patient is a 62 year old female with a history of complete heart block s/p dual chamber pacemaker that is a former patient of Dr. Porter's.      Cardiac PMH: (Old records have been reviewed and summarized below)     1. Complete Heart Block   2. BTK Dual Chamber PM   3. HTN       Current Outpatient Medications:   •  amLODIPine (NORVASC) 5 MG tablet, Take 1.5 tablets by mouth Daily., Disp: 45 tablet, Rfl: 6  •  aspirin 81 MG EC tablet, Take 81 mg by mouth Daily., Disp: , Rfl:     Past Medical History, Past Surgical History, Family history, Social History, and Medications were all reviewed with the patient today and updated as necessary.       Patient Active Problem List   Diagnosis   • Complete heart block (CMS/HCC)   • Spontaneous pneumothorax   • Hypertension   • Atrial tachycardia (CMS/HCC)     Allergies   Allergen Reactions   • Doxycycline Rash     Past Medical History:   Diagnosis Date   • Complete heart block (CMS/HCC)    • Hypertension    • Osteopetrosis    • Spontaneous pneumothorax 1985    Remote Hx. S/P chest tube      Past Surgical History:   Procedure Laterality Date   • PACEMAKER IMPLANTATION       Family History   Problem Relation Age of Onset   • Breast cancer Maternal Aunt 68   • BRCA 1/2 Neg Hx    • Colon cancer Neg Hx    • Endometrial cancer Neg Hx    • Ovarian cancer Neg Hx      Social History     Tobacco Use   • Smoking status: Former Smoker     Types: Cigarettes     Quit date: 2003     Years since quittin.0   • Smokeless tobacco: Never  "Used   • Tobacco comment: y-year   Substance Use Topics   • Alcohol use: No         PHYSICAL EXAM:    /86 (BP Location: Left arm, Patient Position: Sitting)   Pulse 118   Ht 165.1 cm (65\")   Wt 68 kg (150 lb)   LMP  (LMP Unknown)   SpO2 98%   BMI 24.96 kg/m²        Wt Readings from Last 5 Encounters:   12/01/20 68 kg (150 lb)   07/06/20 68 kg (150 lb)   05/20/20 68.5 kg (151 lb)   05/05/20 68.5 kg (151 lb)   11/26/19 69.9 kg (154 lb 3.2 oz)       BP Readings from Last 5 Encounters:   12/01/20 138/86   07/06/20 158/76   05/05/20 164/90   11/26/19 118/60   11/20/18 140/70       General-Well Nourished, Well developed  Eyes - PERRLA  Neck- supple, No mass  CV- regular rate and rhythm, no MRG, No edema  Lung- clear bilaterally  Abd- soft, +BS  Musc/skel - Norm strength and range of motion  Skin- warm and dry  Neuro - Alert & Oriented x 3, appropriate mood.        Medical problems and test results were reviewed with the patient today.     No results found for this or any previous visit (from the past 672 hour(s)).      EKG: (EKG has been independently visualized by me and summarized below)    Procedures     ASSESSMENT and PLAN    1. Bradycardia/Complete Heart Block s/p BTK dual chamber pacemaker with normal function. She has no mode switches on device interrogation today. 100% RV pacing. Stable thresholds and impedences.      2. HTN- previously controlled off meds. Started on Amlodipine 5mg daily at last visit and elevated today. Increased to 7.5mg daily with improvement.      3. Tachycardia- patient presented in what appeared to be an atrial tachycardia at last visit that resolved spontaneously.She is asymptomatic.  echocardiogram revealed normal LVEF following last visit. She was started on Lopressor 25mg BID but discontinued due to side effects. Previous device check in July revealed some mode switching that appeared to be an atrial flutter, but only lasted seconds at a time. She has no mode switches on " device interrogation today. Monitor for an increase and will plan to start NOAC if it increases.       Return in about 6 months (around 6/1/2021) for SJM.        Breonna De Paz PA-C   Cardiology/Electrophysiology  12/1/2020  16:07 EST

## 2021-01-15 ENCOUNTER — TRANSCRIBE ORDERS (OUTPATIENT)
Dept: ADMINISTRATIVE | Facility: HOSPITAL | Age: 66
End: 2021-01-15

## 2021-01-15 DIAGNOSIS — Z12.31 VISIT FOR SCREENING MAMMOGRAM: Primary | ICD-10-CM

## 2021-03-14 PROCEDURE — 93294 REM INTERROG EVL PM/LDLS PM: CPT | Performed by: INTERNAL MEDICINE

## 2021-03-14 PROCEDURE — 93296 REM INTERROG EVL PM/IDS: CPT | Performed by: INTERNAL MEDICINE

## 2021-03-23 ENCOUNTER — APPOINTMENT (OUTPATIENT)
Dept: MAMMOGRAPHY | Facility: HOSPITAL | Age: 66
End: 2021-03-23

## 2021-04-13 ENCOUNTER — HOSPITAL ENCOUNTER (OUTPATIENT)
Dept: MAMMOGRAPHY | Facility: HOSPITAL | Age: 66
Discharge: HOME | End: 2021-04-13
Admitting: FAMILY MEDICINE

## 2021-04-13 DIAGNOSIS — Z12.31 VISIT FOR SCREENING MAMMOGRAM: ICD-10-CM

## 2021-04-13 PROCEDURE — 77067 SCR MAMMO BI INCL CAD: CPT | Performed by: RADIOLOGY

## 2021-04-13 PROCEDURE — 77063 BREAST TOMOSYNTHESIS BI: CPT

## 2021-04-13 PROCEDURE — 77067 SCR MAMMO BI INCL CAD: CPT

## 2021-04-13 PROCEDURE — 77063 BREAST TOMOSYNTHESIS BI: CPT | Performed by: RADIOLOGY

## 2021-06-29 ENCOUNTER — OFFICE VISIT (OUTPATIENT)
Dept: CARDIOLOGY | Facility: CLINIC | Age: 66
End: 2021-06-29

## 2021-06-29 VITALS
HEIGHT: 65 IN | SYSTOLIC BLOOD PRESSURE: 148 MMHG | OXYGEN SATURATION: 99 % | DIASTOLIC BLOOD PRESSURE: 82 MMHG | HEART RATE: 100 BPM | WEIGHT: 149 LBS | BODY MASS INDEX: 24.83 KG/M2

## 2021-06-29 DIAGNOSIS — I10 ESSENTIAL HYPERTENSION: ICD-10-CM

## 2021-06-29 DIAGNOSIS — I47.1 ATRIAL TACHYCARDIA (HCC): Primary | ICD-10-CM

## 2021-06-29 DIAGNOSIS — I44.2 COMPLETE HEART BLOCK (HCC): ICD-10-CM

## 2021-06-29 PROCEDURE — 93288 INTERROG EVL PM/LDLS PM IP: CPT | Performed by: PHYSICIAN ASSISTANT

## 2021-06-29 PROCEDURE — 99214 OFFICE O/P EST MOD 30 MIN: CPT | Performed by: PHYSICIAN ASSISTANT

## 2021-06-29 RX ORDER — ATORVASTATIN CALCIUM 10 MG/1
10 TABLET, FILM COATED ORAL DAILY
COMMUNITY

## 2021-06-29 RX ORDER — AMLODIPINE BESYLATE 10 MG/1
10 TABLET ORAL DAILY
Qty: 90 TABLET | Refills: 5 | Status: SHIPPED | OUTPATIENT
Start: 2021-06-29

## 2021-06-29 NOTE — PROGRESS NOTES
Ria Dumont  1955  PCP: NEELIMA Palomo MD    SUBJECTIVE:   Ria Dumont is a 65 y.o. female seen for a follow up visit regarding the following:     Chief Complaint: Follow up for pacemaker check, CHB, HTN     HPI:    Since last visit the patient's status has been stable from a cardiac standpoint. She denies any anginal or heart failure symptoms. No palpitations or tachycardia. BP seems to remain slightly elevated on a regular basis.     History:      Patient is a 62 year old female with a history of complete heart block s/p dual chamber pacemaker that is a former patient of Dr. Porter's.      Cardiac PMH: (Old records have been reviewed and summarized below)     1. Complete Heart Block   2. BTK Dual Chamber PM   3. HTN   4. Echo 5/2020 w/ normal LVEF      Current Outpatient Medications:   •  aspirin 81 MG EC tablet, Take 81 mg by mouth Daily., Disp: , Rfl:   •  atorvastatin (LIPITOR) 10 MG tablet, Take 10 mg by mouth Daily., Disp: , Rfl:   •  amLODIPine (NORVASC) 10 MG tablet, Take 1 tablet by mouth Daily., Disp: 90 tablet, Rfl: 5    Past Medical History, Past Surgical History, Family history, Social History, and Medications were all reviewed with the patient today and updated as necessary.       Patient Active Problem List   Diagnosis   • Complete heart block (CMS/HCC)   • Spontaneous pneumothorax   • Hypertension   • Atrial tachycardia (CMS/HCC)     Allergies   Allergen Reactions   • Doxycycline Rash     Past Medical History:   Diagnosis Date   • Complete heart block (CMS/HCC)    • Hypertension    • Osteopetrosis    • Spontaneous pneumothorax 1985    Remote Hx. S/P chest tube      Past Surgical History:   Procedure Laterality Date   • PACEMAKER IMPLANTATION       Family History   Problem Relation Age of Onset   • Breast cancer Maternal Aunt 68   • BRCA 1/2 Neg Hx    • Colon cancer Neg Hx    • Endometrial cancer Neg Hx    • Ovarian cancer Neg Hx      Social History     Tobacco Use   • Smoking  "status: Former Smoker     Types: Cigarettes     Quit date: 2003     Years since quittin.6   • Smokeless tobacco: Never Used   • Tobacco comment: y-year   Substance Use Topics   • Alcohol use: No         PHYSICAL EXAM:    /82 (BP Location: Left arm, Patient Position: Sitting)   Pulse 100   Ht 165.1 cm (65\")   Wt 67.6 kg (149 lb)   LMP  (LMP Unknown)   SpO2 99%   BMI 24.79 kg/m²        Wt Readings from Last 5 Encounters:   21 67.6 kg (149 lb)   20 68 kg (150 lb)   20 68 kg (150 lb)   20 68.5 kg (151 lb)   20 68.5 kg (151 lb)       BP Readings from Last 5 Encounters:   21 148/82   20 138/86   20 158/76   20 164/90   19 118/60       General-Well Nourished, Well developed  Eyes - PERRLA  Neck- supple, No mass  CV- regular rate and rhythm, no MRG, No edema  Lung- clear bilaterally  Abd- soft, +BS  Musc/skel - Norm strength and range of motion  Skin- warm and dry  Neuro - Alert & Oriented x 3, appropriate mood.        Medical problems and test results were reviewed with the patient today.     No results found for this or any previous visit (from the past 672 hour(s)).      EKG: (EKG has been independently visualized by me and summarized below)    Procedures     ASSESSMENT and PLAN    1. Bradycardia/Complete Heart Block s/p BTK dual chamber pacemaker with normal function. stable thresholds and impedences. RV pacing 100%; Ra pacing 39%; 2 brief episodes of ATach. Battery ~5 years      2. HTN- elevated. Increase Amlodipine to 10mg daily      3. Atrial Tachycardia- patient w/ one remote episode on interrogation. She was started on Metoprolol initially, but discontinued due to side effects. She has not had any significant mode switches today. Will monitor for further episodes or other atrial arrhythmias through device.       Return in about 6 months (around 2021) for BTK.        Breonna De Paz PA-C "   Cardiology/Electrophysiology  6/29/2021  15:13 EDT

## 2021-09-12 PROCEDURE — 93294 REM INTERROG EVL PM/LDLS PM: CPT | Performed by: STUDENT IN AN ORGANIZED HEALTH CARE EDUCATION/TRAINING PROGRAM

## 2021-09-12 PROCEDURE — 93296 REM INTERROG EVL PM/IDS: CPT | Performed by: STUDENT IN AN ORGANIZED HEALTH CARE EDUCATION/TRAINING PROGRAM

## 2021-12-12 PROCEDURE — 93296 REM INTERROG EVL PM/IDS: CPT | Performed by: STUDENT IN AN ORGANIZED HEALTH CARE EDUCATION/TRAINING PROGRAM

## 2021-12-12 PROCEDURE — 93294 REM INTERROG EVL PM/LDLS PM: CPT | Performed by: STUDENT IN AN ORGANIZED HEALTH CARE EDUCATION/TRAINING PROGRAM

## 2022-01-03 NOTE — PROGRESS NOTES
[facility] - Office Note  Ria Dumont         [address] [place] [address]  1955   [phone] (home)        Location:  [place] office.  Visit Type: Follow Up.    01/04/22     PCP:  NEELIMA Palomo MD    Identification:  Ria Dumont is a 66 y.o.  female  retired.      Chief Complaint:   FU on AT, Heart block with pacemaker, HTN    PROBLEM LIST/PMHx:  1. Complete Heart Block    A.  S/P BTK Dual Chamber PM, Dr. Porter  2.  Atrial Tachycardia   A.  Noted incidentally on remote monitoring   B.  Asymptomatic  3.  Essential Hypertension   A.  Echo 5/2020 w/ normal LVEF  4.  Former tobacco abuse           Allergies   Allergen Reactions   • Doxycycline Rash         Current Outpatient Medications:   •  alendronate (FOSAMAX) 70 MG tablet, Every 7 (Seven) Days., Disp: , Rfl:   •  amLODIPine (NORVASC) 10 MG tablet, Take 1 tablet by mouth Daily., Disp: 90 tablet, Rfl: 5  •  aspirin 81 MG EC tablet, Take 81 mg by mouth Daily., Disp: , Rfl:   •  atorvastatin (LIPITOR) 10 MG tablet, Take 10 mg by mouth Daily., Disp: , Rfl:     HPI  Ria Dumont is a 65 yo AA female here today for a routine follow up on CHB with pacemaker, atrial tachycardia -asymptomatic and hypertension.  She is doing well from a cardiac standpoint - no chest pain, no dyspnea.  She was at [employer] on 12/19/21 when she was noted to have atrial tachycardia by device interrogation - she was singing and dancing.            The following portions of the patient's history were reviewed in the chart and updated as appropriate: allergies, current medications, past family history, past medical history, past social history, past surgical history and problem list.    Review of Systems   Constitutional: Negative for malaise/fatigue.   Cardiovascular: Positive for palpitations. Negative for chest pain, dyspnea on exertion and irregular heartbeat.   Respiratory: Negative for cough and shortness of breath.   "  Neurological: Negative for dizziness and light-headedness.   All other systems reviewed and are negative.            height is 165.1 cm (65\") and weight is 68.5 kg (151 lb). Her blood pressure is 138/76 and her pulse is 91. Her oxygen saturation is 97%.   Vitals and nursing note reviewed.   Constitutional:       Appearance: Normal appearance. Well-developed.   Pulmonary:      Effort: Pulmonary effort is normal.      Breath sounds: Normal breath sounds. No wheezing. No rhonchi. No rales.   Cardiovascular:      Normal rate. Regular rhythm.   Pulses:     Intact distal pulses.   Abdominal:      General: Bowel sounds are normal.      Palpations: Abdomen is soft.   Neurological:      Mental Status: Alert.           Procedures   Biotronik dual-chamber pacemaker, programmed DDDR base rate 70.  Atrial pacing 40%, P wave 1.7, threshold 0.4, impedance 448 ohms.  V pacing 100%, R wave 14.6, threshold 0.7, impedance 487 ohms.  Battery voltage is at 4-1/2 years.  She does have a home monitor and actively transmits.  She had 4 episodes of atrial tach on December 19 totaling 20 minutes.  Assessment/ Plan   Diagnoses and all orders for this visit:    1. Complete heart block (HCC) (Primary):  Normal device interrogation as noted above.  RTC 8 months with EKG, repeat interrogation or sooner PRN.    2. Atrial tachycardia (HCC):  1 episode noted on interrogation. Pt aware but not symptomatic.    3. Primary hypertension:  Controlled.  Continue to get routine labs with PCP.  Continue CCB.        Julianna Nguyen PA-C functioning independently.  1/4/2022 14:46 EST      "

## 2022-01-04 ENCOUNTER — OFFICE VISIT (OUTPATIENT)
Dept: CARDIOLOGY | Facility: CLINIC | Age: 67
End: 2022-01-04

## 2022-01-04 VITALS
HEART RATE: 91 BPM | SYSTOLIC BLOOD PRESSURE: 138 MMHG | OXYGEN SATURATION: 97 % | BODY MASS INDEX: 25.16 KG/M2 | HEIGHT: 65 IN | WEIGHT: 151 LBS | DIASTOLIC BLOOD PRESSURE: 76 MMHG

## 2022-01-04 DIAGNOSIS — I44.2 COMPLETE HEART BLOCK: Primary | ICD-10-CM

## 2022-01-04 DIAGNOSIS — I47.1 ATRIAL TACHYCARDIA: ICD-10-CM

## 2022-01-04 DIAGNOSIS — I10 PRIMARY HYPERTENSION: ICD-10-CM

## 2022-01-04 PROCEDURE — 99214 OFFICE O/P EST MOD 30 MIN: CPT | Performed by: PHYSICIAN ASSISTANT

## 2022-01-04 PROCEDURE — 93280 PM DEVICE PROGR EVAL DUAL: CPT | Performed by: PHYSICIAN ASSISTANT

## 2022-01-04 RX ORDER — ALENDRONATE SODIUM 70 MG/1
70 TABLET ORAL
COMMUNITY
Start: 2021-11-19

## 2022-01-31 ENCOUNTER — HOSPITAL ENCOUNTER (OUTPATIENT)
Dept: GENERAL RADIOLOGY | Facility: HOSPITAL | Age: 67
Discharge: HOME | End: 2022-01-31
Admitting: INTERNAL MEDICINE

## 2022-01-31 ENCOUNTER — TRANSCRIBE ORDERS (OUTPATIENT)
Dept: ADMINISTRATIVE | Facility: HOSPITAL | Age: 67
End: 2022-01-31

## 2022-01-31 DIAGNOSIS — R07.89 CHEST WALL PAIN: Primary | ICD-10-CM

## 2022-01-31 DIAGNOSIS — R07.89 CHEST WALL PAIN: ICD-10-CM

## 2022-01-31 PROCEDURE — 71046 X-RAY EXAM CHEST 2 VIEWS: CPT

## 2022-03-13 PROCEDURE — 93296 REM INTERROG EVL PM/IDS: CPT | Performed by: STUDENT IN AN ORGANIZED HEALTH CARE EDUCATION/TRAINING PROGRAM

## 2022-03-13 PROCEDURE — 93294 REM INTERROG EVL PM/LDLS PM: CPT | Performed by: STUDENT IN AN ORGANIZED HEALTH CARE EDUCATION/TRAINING PROGRAM

## 2022-03-16 ENCOUNTER — TRANSCRIBE ORDERS (OUTPATIENT)
Dept: ADMINISTRATIVE | Facility: HOSPITAL | Age: 67
End: 2022-03-16

## 2022-03-16 DIAGNOSIS — Z12.31 VISIT FOR SCREENING MAMMOGRAM: Primary | ICD-10-CM

## 2022-04-22 ENCOUNTER — HOSPITAL ENCOUNTER (OUTPATIENT)
Dept: MAMMOGRAPHY | Facility: HOSPITAL | Age: 67
Discharge: HOME | End: 2022-04-22
Admitting: INTERNAL MEDICINE

## 2022-04-22 DIAGNOSIS — Z12.31 VISIT FOR SCREENING MAMMOGRAM: ICD-10-CM

## 2022-04-22 PROCEDURE — 77063 BREAST TOMOSYNTHESIS BI: CPT | Performed by: RADIOLOGY

## 2022-04-22 PROCEDURE — 77067 SCR MAMMO BI INCL CAD: CPT

## 2022-04-22 PROCEDURE — 77067 SCR MAMMO BI INCL CAD: CPT | Performed by: RADIOLOGY

## 2022-04-22 PROCEDURE — 77063 BREAST TOMOSYNTHESIS BI: CPT

## 2022-09-06 ENCOUNTER — OFFICE VISIT (OUTPATIENT)
Dept: CARDIOLOGY | Facility: CLINIC | Age: 67
End: 2022-09-06

## 2022-09-06 VITALS
WEIGHT: 152.4 LBS | HEIGHT: 65 IN | OXYGEN SATURATION: 97 % | SYSTOLIC BLOOD PRESSURE: 156 MMHG | DIASTOLIC BLOOD PRESSURE: 70 MMHG | HEART RATE: 85 BPM | BODY MASS INDEX: 25.39 KG/M2

## 2022-09-06 DIAGNOSIS — I47.1 ATRIAL TACHYCARDIA: ICD-10-CM

## 2022-09-06 DIAGNOSIS — I44.2 COMPLETE HEART BLOCK: Primary | ICD-10-CM

## 2022-09-06 PROCEDURE — 93280 PM DEVICE PROGR EVAL DUAL: CPT | Performed by: NURSE PRACTITIONER

## 2022-09-06 PROCEDURE — 99213 OFFICE O/P EST LOW 20 MIN: CPT | Performed by: NURSE PRACTITIONER

## 2022-09-06 NOTE — PROGRESS NOTES
Cardiac Electrophysiology Outpatient Note  [facility]    Office Visit     Ria Dumont  [id number]  2022    Primary Care Physician: Altagracia Palomo MD    Referred By: No ref. provider found    CC:   Chief Complaint   Patient presents with   • Complete heart block      PROBLEM LIST/PMHx:  1. Complete Heart Block               A.  S/P BTK Dual Chamber PM, Dr. Potrer  2.  Atrial Tachycardia              A.  Noted incidentally on remote monitoring              B.  Asymptomatic  3.  Essential Hypertension              A.  Echo 2020 w/ normal LVEF  4.  Former tobacco abuse  5.  History of spontaneous pneumothorax x2      History of Present Illness:   Ria Dumont is a 66 y.o. female who presents to the electrophysiology clinic for follow up of complete heart block status post Biotronik dual-chamber pacemaker and atrial tachycardia.  She was last seen in the office about 8 months ago and reports that she has been doing well overall since then.  She denies chest pain, shortness of breath, fatigue, lower extremity edema (except occasionally while traveling), syncope or presyncope.  He denies any cardiac complaints today.    Past Surgical History:   Procedure Laterality Date   • ABLATION OF DYSRHYTHMIC FOCUS     • INSERT / REPLACE / REMOVE PACEMAKER     • PACEMAKER IMPLANTATION         Family History   Problem Relation Age of Onset   • Clotting disorder Mother            • Asthma Father            • Heart attack Sister            • Hypertension Sister    • Cancer Sister    • Breast cancer Maternal Aunt 68   • BRCA 1/2 Neg Hx    • Colon cancer Neg Hx    • Endometrial cancer Neg Hx    • Ovarian cancer Neg Hx        Social History     Socioeconomic History   • Marital status:    Tobacco Use   • Smoking status: Former Smoker     Packs/day: 0.00     Years: 0.00     Pack years: 0.00     Types: Cigarettes     Quit date: 2003     " Years since quittin.8   • Smokeless tobacco: Never Used   • Tobacco comment: y-year   Vaping Use   • Vaping Use: Never used   Substance and Sexual Activity   • Alcohol use: No   • Drug use: No   • Sexual activity: Defer         Current Outpatient Medications:   •  alendronate (FOSAMAX) 70 MG tablet, Take 70 mg by mouth Every 7 (Seven) Days., Disp: , Rfl:   •  amLODIPine (NORVASC) 10 MG tablet, Take 1 tablet by mouth Daily., Disp: 90 tablet, Rfl: 5  •  aspirin 81 MG EC tablet, Take 81 mg by mouth Daily., Disp: , Rfl:   •  atorvastatin (LIPITOR) 10 MG tablet, Take 10 mg by mouth Daily., Disp: , Rfl:     Allergies:   Allergies   Allergen Reactions   • Doxycycline Rash       Review of Systems:  Review of Systems   Constitutional: Negative for malaise/fatigue.   Cardiovascular: Negative for chest pain, dyspnea on exertion, irregular heartbeat, leg swelling, near-syncope, palpitations and syncope.   Respiratory: Negative for shortness of breath.         Vital Signs: Blood pressure 156/70, pulse 85, height 165.1 cm (65\"), weight 69.1 kg (152 lb 6.4 oz), SpO2 97 %, not currently breastfeeding.    Physical Exam  Vitals reviewed.   Cardiovascular:      Rate and Rhythm: Normal rate and regular rhythm.      Heart sounds: Normal heart sounds.   Pulmonary:      Effort: Pulmonary effort is normal.      Breath sounds: Normal breath sounds.   Musculoskeletal:      Right lower leg: No edema.      Left lower leg: No edema.   Skin:     General: Skin is warm and dry.   Neurological:      Mental Status: She is alert and oriented to person, place, and time.   Psychiatric:         Behavior: Behavior is cooperative.         Lab Results   Component Value Date    GLUCOSE 91 2020    CALCIUM 9.7 2020     2020    K 4.3 2020    CO2 27.6 2020     2020    BUN 13 2020    CREATININE 0.75 2020    EGFRIFAFRI 94 2020    BCR 17.3 2020    ANIONGAP 12.4 2020     Lab Results "   Component Value Date    WBC 4.39 05/05/2020    HGB 12.4 05/05/2020    HCT 38.2 05/05/2020    MCV 78.9 (L) 05/05/2020     05/05/2020     No results found for: INR, PROTIME  Lab Results   Component Value Date    TSH 3.860 05/05/2020        Results for orders placed during the hospital encounter of 05/20/20    Adult Transthoracic Echo Complete W/ Cont if Necessary Per Protocol    Interpretation Summary  · Estimated EF appears to be in the range of 66 - 70%.  · There is a trivial pericardial effusion.      I personally viewed and interpreted the patient's EKG/Telemetry/lab data.    Procedures    Device check: Biotronik dual-chamber pacemaker, mode DDDR, rate 70, RA pacing 40%, RV pacing 100%, underlying rhythm complete heart block, events: A few brief runs of atrial tachycardia, longest 11 minutes last December.    Assessment & Plan    1. Complete heart block (HCC)  -Status post dual-chamber pacemaker implantation, see above    2. Atrial tachycardia (HCC)  -She has had atrial tachycardia noticed on several previous device checks.  Most episodes are very brief, longest was 11 minutes last December and she believes she is asymptomatic with all of these episodes.  -We did discuss her daily aspirin use today and I did recommend continuing it because of a possible history of atrial flutter found on her device.  This does not seem to have had any recurrence recently.       Follow Up:  Return in about 8 months (around 5/6/2023).    Adriana Green, APRN  09/06/2022

## 2022-09-11 PROCEDURE — 93296 REM INTERROG EVL PM/IDS: CPT | Performed by: STUDENT IN AN ORGANIZED HEALTH CARE EDUCATION/TRAINING PROGRAM

## 2022-09-11 PROCEDURE — 93294 REM INTERROG EVL PM/LDLS PM: CPT | Performed by: STUDENT IN AN ORGANIZED HEALTH CARE EDUCATION/TRAINING PROGRAM

## 2022-12-11 PROCEDURE — 93294 REM INTERROG EVL PM/LDLS PM: CPT | Performed by: STUDENT IN AN ORGANIZED HEALTH CARE EDUCATION/TRAINING PROGRAM

## 2022-12-11 PROCEDURE — 93296 REM INTERROG EVL PM/IDS: CPT | Performed by: STUDENT IN AN ORGANIZED HEALTH CARE EDUCATION/TRAINING PROGRAM

## 2023-03-12 PROCEDURE — 93294 REM INTERROG EVL PM/LDLS PM: CPT | Performed by: STUDENT IN AN ORGANIZED HEALTH CARE EDUCATION/TRAINING PROGRAM

## 2023-03-12 PROCEDURE — 93296 REM INTERROG EVL PM/IDS: CPT | Performed by: STUDENT IN AN ORGANIZED HEALTH CARE EDUCATION/TRAINING PROGRAM

## 2023-03-13 ENCOUNTER — TRANSCRIBE ORDERS (OUTPATIENT)
Dept: ADMINISTRATIVE | Facility: HOSPITAL | Age: 68
End: 2023-03-13
Payer: MEDICARE

## 2023-03-13 DIAGNOSIS — Z12.31 VISIT FOR SCREENING MAMMOGRAM: Primary | ICD-10-CM

## 2023-04-24 ENCOUNTER — HOSPITAL ENCOUNTER (OUTPATIENT)
Dept: MAMMOGRAPHY | Facility: HOSPITAL | Age: 68
Discharge: HOME | End: 2023-04-24
Admitting: INTERNAL MEDICINE
Payer: MEDICARE

## 2023-04-24 DIAGNOSIS — Z12.31 VISIT FOR SCREENING MAMMOGRAM: ICD-10-CM

## 2023-04-24 PROCEDURE — 77063 BREAST TOMOSYNTHESIS BI: CPT

## 2023-04-24 PROCEDURE — 77067 SCR MAMMO BI INCL CAD: CPT

## 2023-05-09 ENCOUNTER — OFFICE VISIT (OUTPATIENT)
Dept: CARDIOLOGY | Facility: CLINIC | Age: 68
End: 2023-05-09
Payer: MEDICARE

## 2023-05-09 VITALS
HEART RATE: 84 BPM | HEIGHT: 65 IN | OXYGEN SATURATION: 99 % | BODY MASS INDEX: 25.33 KG/M2 | WEIGHT: 152 LBS | DIASTOLIC BLOOD PRESSURE: 70 MMHG | SYSTOLIC BLOOD PRESSURE: 140 MMHG

## 2023-05-09 DIAGNOSIS — I10 PRIMARY HYPERTENSION: ICD-10-CM

## 2023-05-09 DIAGNOSIS — I44.2 COMPLETE HEART BLOCK: ICD-10-CM

## 2023-05-09 DIAGNOSIS — I47.1 ATRIAL TACHYCARDIA: ICD-10-CM

## 2023-05-09 NOTE — PROGRESS NOTES
Cardiac Electrophysiology Outpatient Note  [facility]    Office Visit     Ria Dumont  [id number]      Primary Care Physician: Altagracia Palomo MD    Referred By: No ref. provider found    CC:   Chief Complaint   Patient presents with   • Complete heart block     PROBLEM LIST/PMHx:  1. Complete Heart Block               A.  S/P BTK Dual Chamber PM, Dr. Porter  2.  Atrial Tachycardia              A.  Noted incidentally on remote monitoring              B.  Asymptomatic  3.  Essential Hypertension              A.  Echo 2020 w/ normal LVEF  4.  Former tobacco abuse  5.  History of spontaneous pneumothorax x2      History of Present Illness:   Ria Dumont is a 67 y.o. female who presents to the electrophysiology clinic for follow up of complete heart block status post Biotronik dual-chamber pacemaker and atrial tachycardia.  She was last seen in the office about 8 months ago and reports that she has been doing well overall since then.  She denies chest pain, shortness of breath, fatigue, lower extremity edema (except occasionally while traveling), syncope or presyncope.  He denies any cardiac complaints today.  She is going to to [place] this summer on a trip as well as to [facility].  She looks forward to these things.  She had some gallbladder issues, gallstones but she is delaying surgery for now.  She has not any difficulty with the past few months.    Past Surgical History:   Procedure Laterality Date   • ABLATION OF DYSRHYTHMIC FOCUS     • INSERT / REPLACE / REMOVE PACEMAKER     • PACEMAKER IMPLANTATION         Family History   Problem Relation Age of Onset   • Clotting disorder Mother            • Asthma Father            • Heart attack Sister            • Hypertension Sister    • Cancer Sister    • Breast cancer Maternal Aunt 68   • BRCA 1/2 Neg Hx    • Colon cancer Neg Hx    • Endometrial cancer Neg Hx    • Ovarian  "cancer Neg Hx        Social History     Socioeconomic History   • Marital status:    Tobacco Use   • Smoking status: Former     Packs/day: 0.00     Years: 0.00     Pack years: 0.00     Types: Cigarettes     Quit date: 2003     Years since quittin.4     Passive exposure: Past   • Smokeless tobacco: Never   • Tobacco comments:     y-year   Vaping Use   • Vaping Use: Never used   Substance and Sexual Activity   • Alcohol use: No   • Drug use: No   • Sexual activity: Yes     Partners: Male     Birth control/protection: Tubal ligation         Current Outpatient Medications:   •  alendronate (FOSAMAX) 70 MG tablet, Take 1 tablet by mouth Every 7 (Seven) Days., Disp: , Rfl:   •  amLODIPine (NORVASC) 10 MG tablet, Take 1 tablet by mouth Daily., Disp: 90 tablet, Rfl: 5  •  aspirin 81 MG EC tablet, Take 1 tablet by mouth Daily., Disp: , Rfl:   •  atorvastatin (LIPITOR) 10 MG tablet, Take 1 tablet by mouth Daily., Disp: , Rfl:     Allergies:   Allergies   Allergen Reactions   • Doxycycline Rash       Vital Signs: Blood pressure 140/70, pulse 84, height 165.1 cm (65\"), weight 68.9 kg (152 lb), SpO2 99 %, not currently breastfeeding.    Physical Exam  Vitals reviewed.   Cardiovascular:      Rate and Rhythm: Normal rate and regular rhythm.      Heart sounds: Normal heart sounds.   Pulmonary:      Effort: Pulmonary effort is normal.      Breath sounds: Normal breath sounds.   Musculoskeletal:      Right lower leg: No edema.      Left lower leg: No edema.   Skin:     General: Skin is warm and dry.   Neurological:      Mental Status: She is alert and oriented to person, place, and time.   Psychiatric:         Behavior: Behavior is cooperative.         Lab Results   Component Value Date    GLUCOSE 91 2020    CALCIUM 9.7 2020     2020    K 4.3 2020    CO2 27.6 2020     2020    BUN 13 2020    CREATININE 0.75 2020    EGFRIFAFRI 94 2020    BCR 17.3 " 05/05/2020    ANIONGAP 12.4 05/05/2020     Lab Results   Component Value Date    WBC 4.39 05/05/2020    HGB 12.4 05/05/2020    HCT 38.2 05/05/2020    MCV 78.9 (L) 05/05/2020     05/05/2020     No results found for: INR, PROTIME  Lab Results   Component Value Date    TSH 3.860 05/05/2020        Results for orders placed during the hospital encounter of 05/20/20    Adult Transthoracic Echo Complete W/ Cont if Necessary Per Protocol    Interpretation Summary  · Estimated EF appears to be in the range of 66 - 70%.  · There is a trivial pericardial effusion.      I personally viewed and interpreted the patient's EKG/Telemetry/lab data.    Procedures    Device check: Biotronik pacemaker.  DDDR.  Rate 70.  A paced 43%.  RV paced 100%.  Complete heart block underlying rhythm.  Normal thresholds impedances.  Battery voltage is 3 years remaining.  No arrhythmias other than atrial tachycardia that was less than 30 seconds.    Assessment & Plan    1. Complete heart block (HCC)  -Status post dual-chamber pacemaker implantation, see above    2. Atrial tachycardia (HCC)  -Very brief and rare episodes.  She is asymptomatic this time continue to monitor.  Electronically signed by ROSANGELA Agee, 05/09/23, 3:06 PM EDT.

## 2023-09-10 PROCEDURE — 93294 REM INTERROG EVL PM/LDLS PM: CPT | Performed by: STUDENT IN AN ORGANIZED HEALTH CARE EDUCATION/TRAINING PROGRAM

## 2023-09-10 PROCEDURE — 93296 REM INTERROG EVL PM/IDS: CPT | Performed by: STUDENT IN AN ORGANIZED HEALTH CARE EDUCATION/TRAINING PROGRAM

## 2023-10-16 ENCOUNTER — HOSPITAL ENCOUNTER (OUTPATIENT)
Dept: GENERAL RADIOLOGY | Facility: HOSPITAL | Age: 68
Discharge: HOME | End: 2023-10-16
Admitting: INTERNAL MEDICINE
Payer: MEDICARE

## 2023-10-16 ENCOUNTER — TRANSCRIBE ORDERS (OUTPATIENT)
Dept: GENERAL RADIOLOGY | Facility: HOSPITAL | Age: 68
End: 2023-10-16
Payer: MEDICARE

## 2023-10-16 DIAGNOSIS — S72.002A HIP FRACTURE, LEFT, CLOSED, INITIAL ENCOUNTER: Primary | ICD-10-CM

## 2023-10-16 DIAGNOSIS — S72.002A HIP FRACTURE, LEFT, CLOSED, INITIAL ENCOUNTER: ICD-10-CM

## 2023-10-16 PROCEDURE — 73502 X-RAY EXAM HIP UNI 2-3 VIEWS: CPT

## 2024-01-23 ENCOUNTER — OFFICE VISIT (OUTPATIENT)
Dept: CARDIOLOGY | Facility: CLINIC | Age: 69
End: 2024-01-23
Payer: MEDICARE

## 2024-01-23 VITALS
BODY MASS INDEX: 23.79 KG/M2 | WEIGHT: 142.8 LBS | OXYGEN SATURATION: 98 % | HEIGHT: 65 IN | DIASTOLIC BLOOD PRESSURE: 76 MMHG | SYSTOLIC BLOOD PRESSURE: 148 MMHG | HEART RATE: 84 BPM

## 2024-01-23 DIAGNOSIS — Z95.0 PRESENCE OF CARDIAC PACEMAKER: ICD-10-CM

## 2024-01-23 DIAGNOSIS — I44.2 COMPLETE HEART BLOCK: Primary | ICD-10-CM

## 2024-01-23 DIAGNOSIS — I47.19 ATRIAL TACHYCARDIA: ICD-10-CM

## 2024-01-23 DIAGNOSIS — I10 PRIMARY HYPERTENSION: ICD-10-CM

## 2024-01-23 PROCEDURE — 3078F DIAST BP <80 MM HG: CPT

## 2024-01-23 PROCEDURE — 93280 PM DEVICE PROGR EVAL DUAL: CPT

## 2024-01-23 PROCEDURE — 99214 OFFICE O/P EST MOD 30 MIN: CPT

## 2024-01-23 PROCEDURE — 3077F SYST BP >= 140 MM HG: CPT

## 2024-01-23 PROCEDURE — 1159F MED LIST DOCD IN RCRD: CPT

## 2024-01-23 PROCEDURE — 1160F RVW MEDS BY RX/DR IN RCRD: CPT

## 2024-01-23 RX ORDER — ATORVASTATIN CALCIUM 10 MG/1
10 TABLET, FILM COATED ORAL DAILY
Qty: 90 TABLET | Refills: 1 | Status: SHIPPED | OUTPATIENT
Start: 2024-01-23

## 2024-01-23 RX ORDER — AMLODIPINE BESYLATE 5 MG/1
5 TABLET ORAL DAILY
COMMUNITY

## 2024-01-23 NOTE — PROGRESS NOTES
Cardiac Electrophysiology Outpatient Note  [facility]    Office Visit     Ria Dumont  [id number]  2024    Primary Care Physician: Altagracia Palomo MD    Referred By: No ref. provider found    Subjective     CC: Heart Block, Palpitations        History of Present Illness:   Ria Dumont is a 68 y.o. female who presents to my electrophysiology clinic for follow up of complete heart block s/p DC PPM and atrial tachycardia.  Since we last saw the patient approximately 8 months ago, she reports she has done well from a cardiovascular standpoint.  She denies any episodes of palpitations. Pt denies any chest pain, dyspnea, dyspnea on exertion, orthopnea, PND, palpitations, lower extremity edema, or claudication.  She was hospitalized in [facility] after she fell and broke her hip requiring surgery while on vacation.  She recovered well from this.      Past Medical History:   Diagnosis Date    Complete heart block     Hyperlipidemia     Hypertension     Osteopetrosis     Spontaneous pneumothorax 1985    Remote Hx. S/P chest tube        Past Surgical History:   Procedure Laterality Date    ABLATION OF DYSRHYTHMIC FOCUS      INSERT / REPLACE / REMOVE PACEMAKER      PACEMAKER IMPLANTATION      PARTIAL HIP ARTHROPLASTY Left 10/07/2023       Family History   Problem Relation Age of Onset    Clotting disorder Mother             Asthma Father             Heart attack Sister             Hypertension Sister     Cancer Sister     Breast cancer Maternal Aunt 68    BRCA 1/2 Neg Hx     Colon cancer Neg Hx     Endometrial cancer Neg Hx     Ovarian cancer Neg Hx        Social History     Socioeconomic History    Marital status:    Tobacco Use    Smoking status: Former     Packs/day: 0.00     Years: 0.00     Additional pack years: 0.00     Total pack years: 0.00     Types: Cigarettes     Quit date: 2003     Years since quittin.1      "Passive exposure: Past    Smokeless tobacco: Never    Tobacco comments:     y-year   Vaping Use    Vaping Use: Never used   Substance and Sexual Activity    Alcohol use: No    Drug use: No    Sexual activity: Yes     Partners: Male     Birth control/protection: Tubal ligation         Current Outpatient Medications:     aspirin 81 MG EC tablet, Take 1 tablet by mouth Daily., Disp: , Rfl:     amLODIPine (NORVASC) 5 MG tablet, Take 1 tablet by mouth Daily., Disp: , Rfl:     atorvastatin (LIPITOR) 10 MG tablet, Take 1 tablet by mouth Daily., Disp: 90 tablet, Rfl: 1    Allergies:   Allergies   Allergen Reactions    Doxycycline Rash       Objective   Vital Signs: Blood pressure 148/76, pulse 84, height 165.1 cm (65\"), weight 64.8 kg (142 lb 12.8 oz), SpO2 98%, not currently breastfeeding.    PHYSICAL EXAM  General appearance: Awake, alert, cooperative  Head: Normocephalic, without obvious abnormality, atraumatic  Lungs: Clear to ascultation bilaterally  Heart: Regular rate and rhythm, no murmurs, 2+ LE pulses, no lower extremity swelling  Skin: Skin color, turgor normal, no rashes or lesions  Neurologic: Grossly normal     Lab Results   Component Value Date    GLUCOSE 91 05/05/2020    CALCIUM 9.7 05/05/2020     05/05/2020    K 4.3 05/05/2020    CO2 27.6 05/05/2020     05/05/2020    BUN 13 05/05/2020    CREATININE 0.75 05/05/2020    EGFRIFAFRI 94 05/05/2020    BCR 17.3 05/05/2020    ANIONGAP 12.4 05/05/2020     Lab Results   Component Value Date    WBC 4.39 05/05/2020    HGB 12.4 05/05/2020    HCT 38.2 05/05/2020    MCV 78.9 (L) 05/05/2020     05/05/2020     No results found for: \"INR\", \"PROTIME\"  Lab Results   Component Value Date    TSH 3.860 05/05/2020          Results for orders placed during the hospital encounter of 05/20/20    Adult Transthoracic Echo Complete W/ Cont if Necessary Per Protocol    Interpretation Summary  · Estimated EF appears to be in the range of 66 - 70%.  · There is a trivial " pericardial effusion.         I personally viewed and interpreted the patient's EKG/Telemetry/lab data    Procedures    Ria Dumont  reports that she quit smoking about 20 years ago. Her smoking use included cigarettes. She has been exposed to tobacco smoke. She has never used smokeless tobacco.. I     Advance Care Planning   Advance Care Planning: ACP discussion was declined by the patient. Patient does not have an advance directive, information provided.     Assessment & Plan    1. Complete heart block  S/p DC PPM    2. Presence of cardiac pacemaker  Device interrogated functioning well with 3 and half years left on battery.  Full report detailed below:  BTK DC PPM, DDDR, rate 70, 37% AP, 100% , threshold impedance values acceptable, battery 3 years 5 months, underlying complete heart block, 2 episodes of atrial tach longest 2 minutes    3. Atrial tachycardia  2 brief episodes since last visit.  Patient denies any regular palpitations.  Continue to monitor.     4.  Essential hypertension  BP elevated in office today at 148/76.  All of the patient's medicines were discontinued during international hospital admission for hip surgery in [place] last fall.  She was restarted on amlodipine 5 but was on amlodipine 10 mg prior.  Patient reports she has whitecoat syndrome.  Patient does have a blood pressure cuff at home.  I recommended logging her blood pressures for the next week leading up to her next appointment with her PCP.  If blood pressure still high, will need to go up her amlodipine or add additional antihypertensive therapy.    Follow Up:  Return in about 1 year (around 1/23/2025).      Thank you for allowing me to participate in the care of your patient. Please do not hesitate to contact me with additional questions or concerns.      Geo Chaudhary PA-C  Cardiac Electrophysiology  [facility] / [facility]

## 2024-01-23 NOTE — PROGRESS NOTES
Cardiac Electrophysiology Outpatient Note  [facility]    Office Visit     Ria Dumont  [id number]  2024    Primary Care Physician: Altagracia Palomo MD    Referred By: No ref. provider found    Subjective     No chief complaint on file.      History of Present Illness:   Ria Dumont is a 68 y.o. female who presents to my electrophysiology clinic for follow up of complete heart block s/p pacemaker and     Past Medical History:   Diagnosis Date    Complete heart block     Hyperlipidemia     Hypertension     Osteopetrosis     Spontaneous pneumothorax 1985    Remote Hx. S/P chest tube        Past Surgical History:   Procedure Laterality Date    ABLATION OF DYSRHYTHMIC FOCUS      INSERT / REPLACE / REMOVE PACEMAKER      PACEMAKER IMPLANTATION      PARTIAL HIP ARTHROPLASTY Left 10/07/2023       Family History   Problem Relation Age of Onset    Clotting disorder Mother             Asthma Father             Heart attack Sister             Hypertension Sister     Cancer Sister     Breast cancer Maternal Aunt 68    BRCA 1/2 Neg Hx     Colon cancer Neg Hx     Endometrial cancer Neg Hx     Ovarian cancer Neg Hx        Social History     Socioeconomic History    Marital status:    Tobacco Use    Smoking status: Former     Packs/day: 0.00     Years: 0.00     Additional pack years: 0.00     Total pack years: 0.00     Types: Cigarettes     Quit date: 2003     Years since quittin.1     Passive exposure: Past    Smokeless tobacco: Never    Tobacco comments:     y-year   Vaping Use    Vaping Use: Never used   Substance and Sexual Activity    Alcohol use: No    Drug use: No    Sexual activity: Yes     Partners: Male     Birth control/protection: Tubal ligation         Current Outpatient Medications:     amLODIPine (NORVASC) 10 MG tablet, Take 1 tablet by mouth Daily. (Patient taking differently: Take 0.5 tablets by mouth Daily.),  "Disp: 90 tablet, Rfl: 5    aspirin 81 MG EC tablet, Take 1 tablet by mouth Daily., Disp: , Rfl:     atorvastatin (LIPITOR) 10 MG tablet, Take 1 tablet by mouth Daily. (Patient not taking: Reported on 1/23/2024), Disp: , Rfl:     Allergies:   Allergies   Allergen Reactions    Doxycycline Rash       Objective   Vital Signs: Blood pressure 148/76, pulse 84, height 165.1 cm (65\"), weight 64.8 kg (142 lb 12.8 oz), SpO2 98%, not currently breastfeeding.    PHYSICAL EXAM  General appearance: Awake, alert, cooperative  Head: Normocephalic, without obvious abnormality, atraumatic  Neck: No JVD  Lungs: Clear to ascultation bilaterally  Heart: Regular rate and rhythm, no murmurs, 2+ LE pulses, no lower extremity swelling  Skin: Skin color, turgor normal, no rashes or lesions  Neurologic: Grossly normal     Lab Results   Component Value Date    GLUCOSE 91 05/05/2020    CALCIUM 9.7 05/05/2020     05/05/2020    K 4.3 05/05/2020    CO2 27.6 05/05/2020     05/05/2020    BUN 13 05/05/2020    CREATININE 0.75 05/05/2020    EGFRIFAFRI 94 05/05/2020    BCR 17.3 05/05/2020    ANIONGAP 12.4 05/05/2020     Lab Results   Component Value Date    WBC 4.39 05/05/2020    HGB 12.4 05/05/2020    HCT 38.2 05/05/2020    MCV 78.9 (L) 05/05/2020     05/05/2020     No results found for: \"INR\", \"PROTIME\"  Lab Results   Component Value Date    TSH 3.860 05/05/2020          Results for orders placed during the hospital encounter of 05/20/20    Adult Transthoracic Echo Complete W/ Cont if Necessary Per Protocol    Interpretation Summary  · Estimated EF appears to be in the range of 66 - 70%.  · There is a trivial pericardial effusion.         I personally viewed and interpreted the patient's EKG/Telemetry/lab data    Procedures    Ria Dumont  reports that she quit smoking about 20 years ago. Her smoking use included cigarettes. She has been exposed to tobacco smoke. She has never used smokeless tobacco.. I have educated her on " "the risk of diseases from using tobacco products such as {Tobacco Cessation Diseases:33005::\"cancer\",\"COPD\",\"heart disease\"}.     I advised her to quit and she is {Willing/Not Willing to Quit Tobacco Products:13984}.    I spent {Time Spent Tobacco :66401} minutes counseling the patient.           Advance Care Planning   Advance Care Planning: {Advance Directive Status:53171}     Assessment & Plan    There are no diagnoses linked to this encounter.     Follow Up:  No follow-ups on file.      Thank you for allowing me to participate in the care of your patient. Please do not hesitate to contact me with additional questions or concerns.      Deshawn Drake M.D.  Cardiac Electrophysiologist  [facility] / [facility]            "

## 2024-03-11 ENCOUNTER — TRANSCRIBE ORDERS (OUTPATIENT)
Dept: ADMINISTRATIVE | Facility: HOSPITAL | Age: 69
End: 2024-03-11
Payer: MEDICARE

## 2024-03-11 DIAGNOSIS — Z12.31 VISIT FOR SCREENING MAMMOGRAM: Primary | ICD-10-CM

## 2024-05-18 ENCOUNTER — HOSPITAL ENCOUNTER (OUTPATIENT)
Dept: MAMMOGRAPHY | Facility: HOSPITAL | Age: 69
Discharge: HOME | End: 2024-05-18
Admitting: INTERNAL MEDICINE
Payer: MEDICARE

## 2024-05-18 DIAGNOSIS — Z12.31 VISIT FOR SCREENING MAMMOGRAM: ICD-10-CM

## 2024-05-18 PROCEDURE — 77067 SCR MAMMO BI INCL CAD: CPT

## 2024-05-18 PROCEDURE — 77063 BREAST TOMOSYNTHESIS BI: CPT

## 2024-09-12 PROCEDURE — 93296 REM INTERROG EVL PM/IDS: CPT | Performed by: STUDENT IN AN ORGANIZED HEALTH CARE EDUCATION/TRAINING PROGRAM

## 2024-09-12 PROCEDURE — 93294 REM INTERROG EVL PM/LDLS PM: CPT | Performed by: STUDENT IN AN ORGANIZED HEALTH CARE EDUCATION/TRAINING PROGRAM

## 2024-11-10 ENCOUNTER — APPOINTMENT (OUTPATIENT)
Dept: CT IMAGING | Facility: HOSPITAL | Age: 69
End: 2024-11-10
Payer: MEDICARE

## 2024-11-10 ENCOUNTER — HOSPITAL ENCOUNTER (EMERGENCY)
Facility: HOSPITAL | Age: 69
Discharge: HOME | End: 2024-11-10
Attending: EMERGENCY MEDICINE | Admitting: EMERGENCY MEDICINE
Payer: MEDICARE

## 2024-11-10 VITALS
DIASTOLIC BLOOD PRESSURE: 64 MMHG | HEIGHT: 65 IN | TEMPERATURE: 97.9 F | RESPIRATION RATE: 18 BRPM | SYSTOLIC BLOOD PRESSURE: 129 MMHG | BODY MASS INDEX: 24.16 KG/M2 | OXYGEN SATURATION: 95 % | WEIGHT: 145 LBS | HEART RATE: 70 BPM

## 2024-11-10 DIAGNOSIS — N13.9 OBSTRUCTIVE UROPATHY: Primary | ICD-10-CM

## 2024-11-10 DIAGNOSIS — N20.1 URETEROLITHIASIS: ICD-10-CM

## 2024-11-10 LAB
ALBUMIN SERPL-MCNC: 4.4 G/DL (ref 3.5–5.2)
ALBUMIN/GLOB SERPL: 1.4 G/DL
ALP SERPL-CCNC: 86 U/L (ref 39–117)
ALT SERPL W P-5'-P-CCNC: 10 U/L (ref 1–33)
ANION GAP SERPL CALCULATED.3IONS-SCNC: 13 MMOL/L (ref 5–15)
AST SERPL-CCNC: 16 U/L (ref 1–32)
BACTERIA UR QL AUTO: ABNORMAL /HPF
BASOPHILS # BLD AUTO: 0.04 10*3/MM3 (ref 0–0.2)
BASOPHILS NFR BLD AUTO: 0.3 % (ref 0–1.5)
BILIRUB SERPL-MCNC: 0.2 MG/DL (ref 0–1.2)
BILIRUB UR QL STRIP: NEGATIVE
BUN SERPL-MCNC: 19 MG/DL (ref 8–23)
BUN/CREAT SERPL: 23.8 (ref 7–25)
CALCIUM SPEC-SCNC: 9.4 MG/DL (ref 8.6–10.5)
CHLORIDE SERPL-SCNC: 104 MMOL/L (ref 98–107)
CLARITY UR: ABNORMAL
CO2 SERPL-SCNC: 23 MMOL/L (ref 22–29)
COLOR UR: YELLOW
CREAT SERPL-MCNC: 0.8 MG/DL (ref 0.57–1)
CRP SERPL-MCNC: <0.3 MG/DL (ref 0–0.5)
D-LACTATE SERPL-SCNC: 1.7 MMOL/L (ref 0.5–2)
DEPRECATED RDW RBC AUTO: 34.4 FL (ref 37–54)
EGFRCR SERPLBLD CKD-EPI 2021: 80.4 ML/MIN/1.73
EOSINOPHIL # BLD AUTO: 0.01 10*3/MM3 (ref 0–0.4)
EOSINOPHIL NFR BLD AUTO: 0.1 % (ref 0.3–6.2)
ERYTHROCYTE [DISTWIDTH] IN BLOOD BY AUTOMATED COUNT: 11.9 % (ref 12.3–15.4)
GLOBULIN UR ELPH-MCNC: 3.1 GM/DL
GLUCOSE SERPL-MCNC: 149 MG/DL (ref 65–99)
GLUCOSE UR STRIP-MCNC: NEGATIVE MG/DL
HCT VFR BLD AUTO: 35.9 % (ref 34–46.6)
HGB BLD-MCNC: 11.9 G/DL (ref 12–15.9)
HGB UR QL STRIP.AUTO: NEGATIVE
HYALINE CASTS UR QL AUTO: ABNORMAL /LPF
IMM GRANULOCYTES # BLD AUTO: 0.06 10*3/MM3 (ref 0–0.05)
IMM GRANULOCYTES NFR BLD AUTO: 0.5 % (ref 0–0.5)
KETONES UR QL STRIP: ABNORMAL
LEUKOCYTE ESTERASE UR QL STRIP.AUTO: ABNORMAL
LIPASE SERPL-CCNC: 21 U/L (ref 13–60)
LYMPHOCYTES # BLD AUTO: 0.84 10*3/MM3 (ref 0.7–3.1)
LYMPHOCYTES NFR BLD AUTO: 7.3 % (ref 19.6–45.3)
MAGNESIUM SERPL-MCNC: 2.2 MG/DL (ref 1.6–2.4)
MCH RBC QN AUTO: 26.4 PG (ref 26.6–33)
MCHC RBC AUTO-ENTMCNC: 33.1 G/DL (ref 31.5–35.7)
MCV RBC AUTO: 79.6 FL (ref 79–97)
MONOCYTES # BLD AUTO: 0.62 10*3/MM3 (ref 0.1–0.9)
MONOCYTES NFR BLD AUTO: 5.4 % (ref 5–12)
NEUTROPHILS NFR BLD AUTO: 86.4 % (ref 42.7–76)
NEUTROPHILS NFR BLD AUTO: 9.98 10*3/MM3 (ref 1.7–7)
NITRITE UR QL STRIP: NEGATIVE
NRBC BLD AUTO-RTO: 0 /100 WBC (ref 0–0.2)
PH UR STRIP.AUTO: 8.5 [PH] (ref 5–8)
PLATELET # BLD AUTO: 264 10*3/MM3 (ref 140–450)
PMV BLD AUTO: 9.3 FL (ref 6–12)
POTASSIUM SERPL-SCNC: 3.7 MMOL/L (ref 3.5–5.2)
PROCALCITONIN SERPL-MCNC: 0.04 NG/ML (ref 0–0.25)
PROT SERPL-MCNC: 7.5 G/DL (ref 6–8.5)
PROT UR QL STRIP: NEGATIVE
QT INTERVAL: 488 MS
RBC # BLD AUTO: 4.51 10*6/MM3 (ref 3.77–5.28)
RBC # UR STRIP: ABNORMAL /HPF
REF LAB TEST METHOD: ABNORMAL
SODIUM SERPL-SCNC: 140 MMOL/L (ref 136–145)
SP GR UR STRIP: 1.02 (ref 1–1.03)
SQUAMOUS #/AREA URNS HPF: ABNORMAL /HPF
TROPONIN T SERPL HS-MCNC: <6 NG/L
UROBILINOGEN UR QL STRIP: ABNORMAL
WBC # UR STRIP: ABNORMAL /HPF
WBC NRBC COR # BLD AUTO: 11.55 10*3/MM3 (ref 3.4–10.8)

## 2024-11-10 PROCEDURE — 85025 COMPLETE CBC W/AUTO DIFF WBC: CPT | Performed by: NURSE PRACTITIONER

## 2024-11-10 PROCEDURE — 83605 ASSAY OF LACTIC ACID: CPT | Performed by: NURSE PRACTITIONER

## 2024-11-10 PROCEDURE — 99285 EMERGENCY DEPT VISIT HI MDM: CPT

## 2024-11-10 PROCEDURE — 83690 ASSAY OF LIPASE: CPT | Performed by: NURSE PRACTITIONER

## 2024-11-10 PROCEDURE — 96376 TX/PRO/DX INJ SAME DRUG ADON: CPT

## 2024-11-10 PROCEDURE — 81001 URINALYSIS AUTO W/SCOPE: CPT | Performed by: NURSE PRACTITIONER

## 2024-11-10 PROCEDURE — 25810000003 SODIUM CHLORIDE 0.9 % SOLUTION: Performed by: NURSE PRACTITIONER

## 2024-11-10 PROCEDURE — 25010000002 MORPHINE PER 10 MG: Performed by: EMERGENCY MEDICINE

## 2024-11-10 PROCEDURE — 93005 ELECTROCARDIOGRAM TRACING: CPT | Performed by: NURSE PRACTITIONER

## 2024-11-10 PROCEDURE — 83735 ASSAY OF MAGNESIUM: CPT | Performed by: NURSE PRACTITIONER

## 2024-11-10 PROCEDURE — 84145 PROCALCITONIN (PCT): CPT | Performed by: NURSE PRACTITIONER

## 2024-11-10 PROCEDURE — 80053 COMPREHEN METABOLIC PANEL: CPT | Performed by: NURSE PRACTITIONER

## 2024-11-10 PROCEDURE — 25010000002 ONDANSETRON PER 1 MG: Performed by: EMERGENCY MEDICINE

## 2024-11-10 PROCEDURE — 74177 CT ABD & PELVIS W/CONTRAST: CPT

## 2024-11-10 PROCEDURE — 96374 THER/PROPH/DIAG INJ IV PUSH: CPT

## 2024-11-10 PROCEDURE — 25010000002 ONDANSETRON PER 1 MG: Performed by: NURSE PRACTITIONER

## 2024-11-10 PROCEDURE — 25510000001 IOPAMIDOL 61 % SOLUTION: Performed by: EMERGENCY MEDICINE

## 2024-11-10 PROCEDURE — 86140 C-REACTIVE PROTEIN: CPT | Performed by: NURSE PRACTITIONER

## 2024-11-10 PROCEDURE — 96375 TX/PRO/DX INJ NEW DRUG ADDON: CPT

## 2024-11-10 PROCEDURE — 84484 ASSAY OF TROPONIN QUANT: CPT | Performed by: NURSE PRACTITIONER

## 2024-11-10 PROCEDURE — 25010000002 HYDROMORPHONE 1 MG/ML SOLUTION: Performed by: EMERGENCY MEDICINE

## 2024-11-10 RX ORDER — IOPAMIDOL 612 MG/ML
85 INJECTION, SOLUTION INTRAVASCULAR
Status: COMPLETED | OUTPATIENT
Start: 2024-11-10 | End: 2024-11-10

## 2024-11-10 RX ORDER — TAMSULOSIN HYDROCHLORIDE 0.4 MG/1
1 CAPSULE ORAL DAILY
Qty: 30 CAPSULE | Refills: 0 | Status: SHIPPED | OUTPATIENT
Start: 2024-11-10

## 2024-11-10 RX ORDER — FAMOTIDINE 10 MG/ML
20 INJECTION, SOLUTION INTRAVENOUS ONCE
Status: COMPLETED | OUTPATIENT
Start: 2024-11-10 | End: 2024-11-10

## 2024-11-10 RX ORDER — OXYCODONE AND ACETAMINOPHEN 5; 325 MG/1; MG/1
1 TABLET ORAL EVERY 6 HOURS PRN
Qty: 12 TABLET | Refills: 0 | Status: SHIPPED | OUTPATIENT
Start: 2024-11-10 | End: 2024-11-13

## 2024-11-10 RX ORDER — MORPHINE SULFATE 4 MG/ML
4 INJECTION, SOLUTION INTRAMUSCULAR; INTRAVENOUS ONCE
Status: COMPLETED | OUTPATIENT
Start: 2024-11-10 | End: 2024-11-10

## 2024-11-10 RX ORDER — DOCUSATE SODIUM 100 MG/1
100 CAPSULE, LIQUID FILLED ORAL 2 TIMES DAILY
Qty: 20 CAPSULE | Refills: 0 | Status: SHIPPED | OUTPATIENT
Start: 2024-11-10 | End: 2024-11-20

## 2024-11-10 RX ORDER — ONDANSETRON 2 MG/ML
4 INJECTION INTRAMUSCULAR; INTRAVENOUS ONCE
Status: COMPLETED | OUTPATIENT
Start: 2024-11-10 | End: 2024-11-10

## 2024-11-10 RX ORDER — TAMSULOSIN HYDROCHLORIDE 0.4 MG/1
0.4 CAPSULE ORAL ONCE
Status: COMPLETED | OUTPATIENT
Start: 2024-11-10 | End: 2024-11-10

## 2024-11-10 RX ORDER — ONDANSETRON 4 MG/1
4 TABLET, FILM COATED ORAL EVERY 6 HOURS PRN
Qty: 20 TABLET | Refills: 0 | Status: SHIPPED | OUTPATIENT
Start: 2024-11-10 | End: 2024-11-15

## 2024-11-10 RX ADMIN — IOPAMIDOL 85 ML: 612 INJECTION, SOLUTION INTRAVENOUS at 08:24

## 2024-11-10 RX ADMIN — ONDANSETRON 4 MG: 2 INJECTION INTRAMUSCULAR; INTRAVENOUS at 09:12

## 2024-11-10 RX ADMIN — FAMOTIDINE 20 MG: 10 INJECTION, SOLUTION INTRAVENOUS at 07:45

## 2024-11-10 RX ADMIN — SODIUM CHLORIDE 500 ML: 9 INJECTION, SOLUTION INTRAVENOUS at 07:44

## 2024-11-10 RX ADMIN — HYDROMORPHONE HYDROCHLORIDE 1 MG: 1 INJECTION, SOLUTION INTRAMUSCULAR; INTRAVENOUS; SUBCUTANEOUS at 09:12

## 2024-11-10 RX ADMIN — TAMSULOSIN HYDROCHLORIDE 0.4 MG: 0.4 CAPSULE ORAL at 09:20

## 2024-11-10 RX ADMIN — MORPHINE SULFATE 4 MG: 4 INJECTION, SOLUTION INTRAMUSCULAR; INTRAVENOUS at 07:57

## 2024-11-10 RX ADMIN — ONDANSETRON 4 MG: 2 INJECTION INTRAMUSCULAR; INTRAVENOUS at 07:45

## 2024-11-10 NOTE — Clinical Note
[facility] EMERGENCY DEPARTMENT  [address]  [facility] [address]  Phone: [phone]    Ria Dumont was seen and treated in our emergency department on 11/10/2024.  She may return to work on 11/13/2024.         Thank you for choosing [facility].    Javi Pate MD

## 2024-11-10 NOTE — ED PROVIDER NOTES
EMERGENCY DEPARTMENT ENCOUNTER    Pt Name: Ria Dumont  MRN: [id number]  Pt :   1955  Room Number:  1616  Date of encounter:  11/10/2024  PCP: Altagracia Palomo MD  ED Provider: RASHEEDA Craft    Historian: patient      HPI:  Chief Complaint: abdominal pain        Context: Ria Dumont is a 68 y.o. female who presents to the ED c/o with abdominal pain and vomiting.  Patient states woke up early morning with above symptoms.  States pain is severe to the left side of the abdomen and wraps around the flank.  She had 1 loose stool this morning.  Reports no abdominal surgeries aside from a tubal.  History of left hip arthroplasty, pacemaker and pneumothorax      PAST MEDICAL HISTORY  Past Medical History:   Diagnosis Date    Complete heart block     Hyperlipidemia     Hypertension     Osteopetrosis     Spontaneous pneumothorax 1985    Remote Hx. S/P chest tube          PAST SURGICAL HISTORY  Past Surgical History:   Procedure Laterality Date    ABLATION OF DYSRHYTHMIC FOCUS      INSERT / REPLACE / REMOVE PACEMAKER      PACEMAKER IMPLANTATION      PARTIAL HIP ARTHROPLASTY Left 10/07/2023         FAMILY HISTORY  Family History   Problem Relation Age of Onset    Clotting disorder Mother             Asthma Father             Heart attack Sister             Hypertension Sister     Cancer Sister     Breast cancer Maternal Aunt 68    BRCA 1/2 Neg Hx     Colon cancer Neg Hx     Endometrial cancer Neg Hx     Ovarian cancer Neg Hx          SOCIAL HISTORY  Social History     Socioeconomic History    Marital status:    Tobacco Use    Smoking status: Former     Current packs/day: 0.00     Types: Cigarettes     Quit date: 2003     Years since quittin.9     Passive exposure: Past    Smokeless tobacco: Never    Tobacco comments:     y-year   Vaping Use    Vaping status: Never Used   Substance and Sexual Activity    Alcohol use: No    Drug use: No    Sexual activity:  Yes     Partners: Male     Birth control/protection: Tubal ligation         ALLERGIES  Doxycycline        REVIEW OF SYSTEMS  Review of Systems       All systems reviewed and negative except for those discussed in HPI.       PHYSICAL EXAM    I have reviewed the triage vital signs and nursing notes.    ED Triage Vitals [11/10/24 0701]   Temp Heart Rate Resp BP SpO2   97.9 °F (36.6 °C) 80 18 146/69 100 %      Temp src Heart Rate Source Patient Position BP Location FiO2 (%)   Oral Monitor Sitting Left arm --       Physical Exam  GENERAL:   Appears in acute distress.  Actively vomits  HENT: Nares patent.  EYES: No scleral icterus.  CV: Regular rhythm, regular rate.  RESPIRATORY: Normal effort.  No audible wheezes, rales or rhonchi.  ABDOMEN: Soft, nontender, no CVA tenderness  MUSCULOSKELETAL: No deformities.   NEURO: Alert, moves all extremities, follows commands.  SKIN: Warm, dry, no rash visualized.      LAB RESULTS  Recent Results (from the past 24 hours)   ECG 12 Lead Electrolyte Imbalance    Collection Time: 11/10/24  7:29 AM   Result Value Ref Range    QT Interval 488 ms   CBC Auto Differential    Collection Time: 11/10/24  7:43 AM    Specimen: Blood   Result Value Ref Range    WBC 11.55 (H) 3.40 - 10.80 10*3/mm3    RBC 4.51 3.77 - 5.28 10*6/mm3    Hemoglobin 11.9 (L) 12.0 - 15.9 g/dL    Hematocrit 35.9 34.0 - 46.6 %    MCV 79.6 79.0 - 97.0 fL    MCH 26.4 (L) 26.6 - 33.0 pg    MCHC 33.1 31.5 - 35.7 g/dL    RDW 11.9 (L) 12.3 - 15.4 %    RDW-SD 34.4 (L) 37.0 - 54.0 fl    MPV 9.3 6.0 - 12.0 fL    Platelets 264 140 - 450 10*3/mm3    Neutrophil % 86.4 (H) 42.7 - 76.0 %    Lymphocyte % 7.3 (L) 19.6 - 45.3 %    Monocyte % 5.4 5.0 - 12.0 %    Eosinophil % 0.1 (L) 0.3 - 6.2 %    Basophil % 0.3 0.0 - 1.5 %    Immature Grans % 0.5 0.0 - 0.5 %    Neutrophils, Absolute 9.98 (H) 1.70 - 7.00 10*3/mm3    Lymphocytes, Absolute 0.84 0.70 - 3.10 10*3/mm3    Monocytes, Absolute 0.62 0.10 - 0.90 10*3/mm3    Eosinophils, Absolute 0.01  0.00 - 0.40 10*3/mm3    Basophils, Absolute 0.04 0.00 - 0.20 10*3/mm3    Immature Grans, Absolute 0.06 (H) 0.00 - 0.05 10*3/mm3    nRBC 0.0 0.0 - 0.2 /100 WBC   Comprehensive Metabolic Panel    Collection Time: 11/10/24  7:43 AM    Specimen: Blood   Result Value Ref Range    Glucose 149 (H) 65 - 99 mg/dL    BUN 19 8 - 23 mg/dL    Creatinine 0.80 0.57 - 1.00 mg/dL    Sodium 140 136 - 145 mmol/L    Potassium 3.7 3.5 - 5.2 mmol/L    Chloride 104 98 - 107 mmol/L    CO2 23.0 22.0 - 29.0 mmol/L    Calcium 9.4 8.6 - 10.5 mg/dL    Total Protein 7.5 6.0 - 8.5 g/dL    Albumin 4.4 3.5 - 5.2 g/dL    ALT (SGPT) 10 1 - 33 U/L    AST (SGOT) 16 1 - 32 U/L    Alkaline Phosphatase 86 39 - 117 U/L    Total Bilirubin 0.2 0.0 - 1.2 mg/dL    Globulin 3.1 gm/dL    A/G Ratio 1.4 g/dL    BUN/Creatinine Ratio 23.8 7.0 - 25.0    Anion Gap 13.0 5.0 - 15.0 mmol/L    eGFR 80.4 >60.0 mL/min/1.73   Lactic Acid, Plasma    Collection Time: 11/10/24  7:43 AM    Specimen: Blood   Result Value Ref Range    Lactate 1.7 0.5 - 2.0 mmol/L   Lipase    Collection Time: 11/10/24  7:43 AM    Specimen: Blood   Result Value Ref Range    Lipase 21 13 - 60 U/L   Magnesium    Collection Time: 11/10/24  7:43 AM    Specimen: Blood   Result Value Ref Range    Magnesium 2.2 1.6 - 2.4 mg/dL   Procalcitonin    Collection Time: 11/10/24  7:43 AM    Specimen: Blood   Result Value Ref Range    Procalcitonin 0.04 0.00 - 0.25 ng/mL   C-reactive Protein    Collection Time: 11/10/24  7:43 AM    Specimen: Blood   Result Value Ref Range    C-Reactive Protein <0.30 0.00 - 0.50 mg/dL   Single High Sensitivity Troponin T    Collection Time: 11/10/24  7:43 AM    Specimen: Blood   Result Value Ref Range    HS Troponin T <6 <14 ng/L   Urinalysis With Culture If Indicated - Urine, Clean Catch    Collection Time: 11/10/24  8:37 AM    Specimen: Urine, Clean Catch   Result Value Ref Range    Color, UA Yellow Yellow, Straw    Appearance, UA Turbid (A) Clear    pH, UA 8.5 (H) 5.0 - 8.0     Specific Gravity, UA 1.018 1.001 - 1.030    Glucose, UA Negative Negative    Ketones, UA Trace (A) Negative    Bilirubin, UA Negative Negative    Blood, UA Negative Negative    Protein, UA Negative Negative    Leuk Esterase, UA Small (1+) (A) Negative    Nitrite, UA Negative Negative    Urobilinogen, UA 0.2 E.U./dL 0.2 - 1.0 E.U./dL   Urinalysis, Microscopic Only - Urine, Clean Catch    Collection Time: 11/10/24  8:37 AM    Specimen: Urine, Clean Catch   Result Value Ref Range    RBC, UA 3-5 (A) None Seen, 0-2 /HPF    WBC, UA 0-2 None Seen, 0-2 /HPF    Bacteria, UA None Seen None Seen, Trace /HPF    Squamous Epithelial Cells, UA 0-2 None Seen, 0-2 /HPF    Hyaline Casts, UA 0-6 0 - 6 /LPF    Methodology Automated Microscopy        If labs were ordered, I independently reviewed the results and considered them in treating the patient.        RADIOLOGY  CT Abdomen Pelvis With Contrast    Result Date: 11/10/2024  CT ABDOMEN PELVIS W CONTRAST Date of Exam: 11/10/2024 8:11 AM EST Indication: abdominal pain. Comparison: None available. Technique: Axial CT images were obtained of the abdomen and pelvis following the uneventful intravenous administration of 85 mL Isovue-300. Reconstructed coronal and sagittal images were also obtained. Automated exposure control and iterative construction methods were used. Findings: LUNG BASES:  Unremarkable without mass or infiltrate. Pacemaker leads are noted. LIVER: There are numerous hepatic cysts measuring up to 2.6 cm in size. BILIARY/GALLBLADDER: The gallbladder contains layering sludge and stones. SPLEEN:  Unremarkable PANCREAS:  Unremarkable ADRENAL:  Unremarkable KIDNEYS: The right kidney is unremarkable in appearance with uniform parenchymal enhancement. No hydronephrosis or hydroureter. The left kidney appears edematous with decreased parenchymal enhancement with perinephric stranding noted. There is hydronephrosis and proximal hydroureter due to a 5 mm calculus located in the  proximal left ureter at level L4. Distal to the calculus the ureter returns to normal caliber without additional ureterolithiasis noted. On delayed imaging there is decreased left renal excretion. GASTROINTESTINAL/MESENTERY: There is a small sliding hiatal hernia. The remaining gastrointestinal tract is unremarkable in appearance. The appendix is normal in caliber. MESENTERIC VESSELS:  Patent. AORTA/IVC:  Normal caliber. RETROPERITONEUM/LYMPH NODES:  Unremarkable REPRODUCTIVE: The uterus is visualized. BLADDER:  Unremarkable OSSEUS STRUCTURES: There is a left total hip arthroplasty. There appears to be chronic mild compression deformity along the superior endplate of L4.     Impression: 1. Left renal obstructive uropathy due to a 5 mm calculus located in the proximal left ureter. Electronically Signed: Moni Salas MD  11/10/2024 8:31 AM EST  Workstation ID: [id number]     I ordered and independently reviewed the above noted radiographic studies.      I viewed images of CT abdomen pelvis which showed left urolithiasis per my independent interpretation.    See radiologist's dictation for official interpretation.        PROCEDURES    Procedures    ECG 12 Lead Electrolyte Imbalance   Final Result   Test Reason : Electrolyte Imbalance   Blood Pressure :   */*   mmHG   Vent. Rate :  70 BPM     Atrial Rate :  70 BPM      P-R Int : 162 ms          QRS Dur : 180 ms       QT Int : 488 ms       P-R-T Axes :  31 -10  58 degrees     QTcB Int : 527 ms      AV dual-paced rhythm   Abnormal ECG   No previous ECGs available   Confirmed by ANTHONY GRUBBS MD ([id number]) on 11/10/2024 2:18:22 PM      Referred By: ED MD           Confirmed By: ANTHONY GRUBBS MD          MEDICATIONS GIVEN IN ER    Medications   sodium chloride 0.9 % bolus 500 mL (0 mL Intravenous Stopped 11/10/24 0858)   ondansetron (ZOFRAN) injection 4 mg (4 mg Intravenous Given 11/10/24 0745)   famotidine (PEPCID) injection 20 mg (20 mg Intravenous Given 11/10/24 0745)    Morphine sulfate (PF) injection 4 mg (4 mg Intravenous Given 11/10/24 6757)   iopamidol (ISOVUE-300) 61 % injection 85 mL (85 mL Intravenous Given 11/10/24 0824)   tamsulosin (FLOMAX) 24 hr capsule 0.4 mg (0.4 mg Oral Given 11/10/24 0920)   HYDROmorphone (DILAUDID) injection 1 mg (1 mg Intravenous Given 11/10/24 0912)   ondansetron (ZOFRAN) injection 4 mg (4 mg Intravenous Given 11/10/24 0912)         MEDICAL DECISION MAKING, PROGRESS, and CONSULTS    All labs, if obtained, have been independently reviewed by me.  All radiology studies, if obtained, have been reviewed by me and the radiologist dictating the report.  All EKG's, if obtained, have been independently viewed and interpreted by me/my attending physician.      Discussion below represents my analysis of pertinent findings related to patient's condition, differential diagnosis, treatment plan and final disposition.  Patient is 68-year-old female who presents for evaluation of abdominal pain since this morning.  On physical exam initially she was in acute distress, grimacing in pain, she was vomiting.  Lab work was obtained significant for mild leukocytosis, 3-5 red blood cells in UA, normal creatinine 0.8, CT abdomen pelvis was obtained showing a left renal obstructive uropathy due to 5 mm calculus at the proximal left ureter.  Patient received pain control, Flomax, she is significantly improved with pain medications.  Discharged home with close follow-up with urology, information provided on discharge.  Discussed return precaution for any new or worsening of pain or other symptoms.  She voiced understanding, was agreeable                       Differential diagnosis:    Bowel obstruction, diverticulosis, diverticulitis, ureterolithiasis, hydroureter, UTI      Additional sources:    - Discussed/ obtained information from independent historians: Spouse    - External (non-ED) record review: 10/16/2023 left hip fracture    - Chronic or social conditions  impacting care:     - Shared decision making: Patient, spouse      Orders placed during this visit:  Orders Placed This Encounter   Procedures    CT Abdomen Pelvis With Contrast    CBC Auto Differential    Comprehensive Metabolic Panel    Lactic Acid, Plasma    Lipase    Magnesium    Procalcitonin    C-reactive Protein    Single High Sensitivity Troponin T    Urinalysis With Culture If Indicated - Urine, Clean Catch    Urinalysis, Microscopic Only - Urine, Clean Catch    Urinalysis With Culture If Indicated -    ECG 12 Lead Electrolyte Imbalance         Additional orders considered but not ordered:      ED Course:    Consultants:      ED Course as of 11/10/24 1455   Sun Nov 10, 2024   0824 WBC(!): 11.55 [IR]   0824 Glucose(!): 149 [IR]   0856 I reevaluated the patient, discussed lab work and findings of CT significant for left renal obstructive uropathy due to 5 mm calculus at the proximal left ureter.  Patient states he is still in pain, no vomiting.  Will order another dose of pain medications, awaiting urinalysis to result [IR]      ED Course User Index  [IR] Suzie Saucedo, APRN              Shared Decision Making:  After my consideration of clinical presentation and any laboratory/radiology studies obtained, I discussed the findings with the patient/patient representative who is in agreement with the treatment plan and the final disposition.   Risks and benefits of discharge and/or observation/admission were discussed.       AS OF 14:55 EST VITALS:    BP - 129/64  HR - 70  TEMP - 97.9 °F (36.6 °C) (Oral)  O2 SATS - 95%      CRISTIANO reviewed by Javi Pate MD           DIAGNOSIS  Final diagnoses:   Obstructive uropathy   Ureterolithiasis         DISPOSITION  DISCHARGE    Patient discharged in stable condition.    Reviewed implications of results, diagnosis, meds, responsibility to follow up, warning signs and symptoms of possible worsening, potential complications and reasons to return to  ER.    Patient/Family voiced understanding of above instructions.    Discussed plan for discharge, as there is no emergent indication for admission.  Pt/family is agreeable and understands need for follow up and possible repeat testing.  Pt/family is aware that discharge does not mean that nothing is wrong but that it indicates no emergency is currently present that requires admission and they must continue care with follow-up as given below or with a physician of their choice.     FOLLOW-UP  Ghulam Muller MD  [address]  [place] [address]  [phone]    Call in 1 day      [facility] EMERGENCY DEPARTMENT  [address] [address] [phone]             Medication List        New Prescriptions      docusate sodium 100 MG capsule  Commonly known as: COLACE  Take 1 capsule by mouth 2 (Two) Times a Day for 10 days.     ondansetron 4 MG tablet  Commonly known as: ZOFRAN  Take 1 tablet by mouth Every 6 (Six) Hours As Needed for Nausea or Vomiting for up to 5 days.     oxyCODONE-acetaminophen 5-325 MG per tablet  Commonly known as: PERCOCET  Take 1 tablet by mouth Every 6 (Six) Hours As Needed for Severe Pain for up to 3 days.     tamsulosin 0.4 MG capsule 24 hr capsule  Commonly known as: FLOMAX  Take 1 capsule by mouth Daily.               Where to Get Your Medications        These medications were sent to [facility] [id number] - [place], [place] - [address] - [phone]  - [phone] FX  [address], [place] [address]      Phone: [phone]   docusate sodium 100 MG capsule  ondansetron 4 MG tablet  oxyCODONE-acetaminophen 5-325 MG per tablet  tamsulosin 0.4 MG capsule 24 hr capsule            Please note that portions of this document were completed with voice recognition software.     RASHEEDA Craft   11/10/24   14:55 EST        Suzie Saucedo APRDANIELA  11/10/24 1259

## 2024-11-10 NOTE — Clinical Note
[facility] EMERGENCY DEPARTMENT  [address]  [facility] [address]  Phone: [phone]    Ria Dumont was seen and treated in our emergency department on 11/10/2024.  She may return to work on 11/13/2024.         Thank you for choosing [facility].    Javi Pate MD

## 2024-11-19 ENCOUNTER — OFFICE VISIT (OUTPATIENT)
Dept: UROLOGY | Facility: CLINIC | Age: 69
End: 2024-11-19
Payer: MEDICARE

## 2024-11-19 VITALS — BODY MASS INDEX: 24.16 KG/M2 | HEIGHT: 65 IN | WEIGHT: 145 LBS

## 2024-11-19 DIAGNOSIS — N20.1 LEFT URETERAL STONE: Primary | ICD-10-CM

## 2024-11-19 DIAGNOSIS — N20.0 NEPHROLITHIASIS: ICD-10-CM

## 2024-11-19 LAB
BILIRUB BLD-MCNC: NEGATIVE MG/DL
CLARITY, POC: CLEAR
COLOR UR: YELLOW
GLUCOSE UR STRIP-MCNC: NEGATIVE MG/DL
KETONES UR QL: NEGATIVE
LEUKOCYTE EST, POC: NEGATIVE
NITRITE UR-MCNC: NEGATIVE MG/ML
PH UR: 6 [PH] (ref 5–8)
PROT UR STRIP-MCNC: NEGATIVE MG/DL
RBC # UR STRIP: NEGATIVE /UL
SP GR UR: 1.01 (ref 1–1.03)
UROBILINOGEN UR QL: NORMAL

## 2024-11-19 PROCEDURE — 99204 OFFICE O/P NEW MOD 45 MIN: CPT | Performed by: STUDENT IN AN ORGANIZED HEALTH CARE EDUCATION/TRAINING PROGRAM

## 2024-11-19 PROCEDURE — 81002 URINALYSIS NONAUTO W/O SCOPE: CPT | Performed by: STUDENT IN AN ORGANIZED HEALTH CARE EDUCATION/TRAINING PROGRAM

## 2024-11-19 PROCEDURE — 1160F RVW MEDS BY RX/DR IN RCRD: CPT | Performed by: STUDENT IN AN ORGANIZED HEALTH CARE EDUCATION/TRAINING PROGRAM

## 2024-11-19 PROCEDURE — 1159F MED LIST DOCD IN RCRD: CPT | Performed by: STUDENT IN AN ORGANIZED HEALTH CARE EDUCATION/TRAINING PROGRAM

## 2024-11-19 RX ORDER — ALENDRONATE SODIUM 70 MG/1
TABLET ORAL
COMMUNITY
Start: 2024-11-08

## 2024-11-19 RX ORDER — ROSUVASTATIN CALCIUM 10 MG/1
TABLET, COATED ORAL
COMMUNITY
Start: 2024-08-16

## 2024-11-19 NOTE — PROGRESS NOTES
Office Note Kidney Stone      Patient Name: Ria Dumont  : 1955   MRN: [id number]     Chief Complaint: History of Kidney Stones.    Chief Complaint   Patient presents with    Nephrolithiasis       Referring Provider: Javi Pate MD    History of Present Illness: Ria Dumont is a 68 y.o. female who presents today for history of Kidney Stones.     Presented to ED 11/10/24 with left abdominal/flank pain. CT revealed 5mm left proximal ureteral stone. Discharged with Flomax, pain med, and strainer.     Presents today for follow-up. Doing well. Pain resolved within few days, no pain today. No dysuria or hematuria. No fever or chills.  She has not visualized passage of stone.    No previous stone history. She doesn't think she drink enough water.     Stone related history  Family history of stones:   yes, aunt (possibly)  Renal disease or anatomic abnormality: no  Malabsorptive disease or gastric bypass: no  Frequent UTI's    no  Parathyroid disease    no      Subjective      Review of System: Review of Systems   Genitourinary:  Negative for decreased urine volume, difficulty urinating, dysuria, enuresis, flank pain, frequency, hematuria and urgency.      I have reviewed the ROS documented by my clinical staff, I have updated appropriately and I agree. Rashid Gonzalez MD    Past Medical History:   Past Medical History:   Diagnosis Date    Complete heart block     Hyperlipidemia     Hypertension     Kidney stone 11/10/2024    Osteopetrosis     Spontaneous pneumothorax 1985    Remote Hx. S/P chest tube        Past Surgical History:   Past Surgical History:   Procedure Laterality Date    ABLATION OF DYSRHYTHMIC FOCUS      INSERT / REPLACE / REMOVE PACEMAKER      PACEMAKER IMPLANTATION      PARTIAL HIP ARTHROPLASTY Left 10/07/2023    TUBAL ABDOMINAL LIGATION         Family History:   Family History   Problem Relation Age of Onset    Clotting disorder Mother             Asthma  Father             Heart attack Sister             Hypertension Sister     Cancer Sister     Breast cancer Maternal Aunt 68    Cancer Maternal Grandfather         Not sure    Cancer Maternal Grandmother         Stomach?    BRCA 1/2 Neg Hx     Colon cancer Neg Hx     Endometrial cancer Neg Hx     Ovarian cancer Neg Hx        Social History:   Social History     Socioeconomic History    Marital status:    Tobacco Use    Smoking status: Former     Current packs/day: 0.00     Types: Cigarettes     Quit date: 2003     Years since quittin.0     Passive exposure: Past    Smokeless tobacco: Never    Tobacco comments:     It’s been so long ago.   Vaping Use    Vaping status: Never Used   Substance and Sexual Activity    Alcohol use: No    Drug use: No    Sexual activity: Yes     Partners: Male     Birth control/protection: Tubal ligation       Medications:     Current Outpatient Medications:     alendronate (FOSAMAX) 70 MG tablet, , Disp: , Rfl:     amLODIPine (NORVASC) 5 MG tablet, Take 1 tablet by mouth Daily., Disp: , Rfl:     aspirin 81 MG EC tablet, Take 1 tablet by mouth Daily., Disp: , Rfl:     atorvastatin (LIPITOR) 10 MG tablet, Take 1 tablet by mouth Daily., Disp: 90 tablet, Rfl: 1    docusate sodium (COLACE) 100 MG capsule, Take 1 capsule by mouth 2 (Two) Times a Day for 10 days., Disp: 20 capsule, Rfl: 0    rosuvastatin (CRESTOR) 10 MG tablet, , Disp: , Rfl:     tamsulosin (FLOMAX) 0.4 MG capsule 24 hr capsule, Take 1 capsule by mouth Daily., Disp: 30 capsule, Rfl: 0    Allergies:   Allergies   Allergen Reactions    Doxycycline Rash     Bladder & Bowel Symptom Questionnaire    How often do you usually urinate during the day ?   1 - About every 3-4 hours   2.   How many timed do you urinate at night?   0 - 0-1 time at night   3.   What is the reason that you usually urinate?   1 - Mild urge (can delay over an hour)   4.   Once you get the urge to go, how long can you      "comfortably delay?   1 - 30-60 min   5.   How often do you get a sudden urge that makes you rush to the bathroom?   1 - Rarely   6.   How often does a sudden urge to urinate result in you leaking urine or wetting pads?   1 - Rarely   7.  In your opinion, how good is your bladder control?   2 - Good   8.  Do you have accidental bowel leakage?   no   9.  Do you have difficulty fully emptying your bladder?   no   10.  Do you experience accidental leakage when performing some physical activity such as coughing, sneezing, laughing or exercise?   no   11. Have you tried medications to help improve your symptoms?   no   12. Would you be interested in learning about a long-lasting option that may help you with your symptoms?   yes                                                                             Total Score   7     0-7 (Mild) 8-16 (Moderate) 17-28 (Severe)       Objective     Physical Exam:   Vital Signs:   Vitals:    11/19/24 1216   Weight: 65.8 kg (145 lb)   Height: 165.1 cm (65\")     Body mass index is 24.13 kg/m².       GEN: NAD  HEENT: NCAT, EOMI  PULM: No respiratory distress  CV: Appears well perfused  ABD: Soft, non-tender, non-distended  : No CVAT BL  EXT: No obvious deformities  MSK: Normal ROM BL UE  NEURO: No focal deficits, AOx3  PSYCH: Appropriate mood and affect      Labs:   Brief Urine Lab Results  (Last result in the past 365 days)        Color   Clarity   Blood   Leuk Est   Nitrite   Protein   CREAT   Urine HCG        11/19/24 1207 Yellow   Clear   Negative   Negative   Negative   Negative                        Color, UA   Date Value Ref Range Status   11/10/2024 Yellow Yellow, Straw Final     Color   Date Value Ref Range Status   11/19/2024 Yellow Yellow, Straw, Dark Yellow, Adriana Final     Bilirubin, UA   Date Value Ref Range Status   11/10/2024 Negative Negative Final     Bilirubin   Date Value Ref Range Status   11/19/2024 Negative Negative Final     Urobilinogen, UA   Date Value Ref Range " "Status   11/19/2024 Normal Normal, 0.2 E.U./dL Final   11/10/2024 0.2 E.U./dL 0.2 - 1.0 E.U./dL Final     Blood, UA   Date Value Ref Range Status   11/19/2024 Negative Negative Final     Leuk Esterase, UA   Date Value Ref Range Status   11/10/2024 Small (1+) (A) Negative Final     Leukocytes   Date Value Ref Range Status   11/19/2024 Negative Negative Final       Lab Results   Component Value Date    GLUCOSE 149 (H) 11/10/2024    CALCIUM 9.4 11/10/2024     11/10/2024    K 3.7 11/10/2024    CO2 23.0 11/10/2024     11/10/2024    BUN 19 11/10/2024    CREATININE 0.80 11/10/2024    EGFRIFAFRI 94 05/05/2020    BCR 23.8 11/10/2024    ANIONGAP 13.0 11/10/2024       Lab Results   Component Value Date    WBC 11.55 (H) 11/10/2024    HGB 11.9 (L) 11/10/2024    HCT 35.9 11/10/2024    MCV 79.6 11/10/2024     11/10/2024       Stone Analysis  No components found for: \"JKTKJ9YTYHBL\", \"YDJRY1NPSAYQ\", \"[id number]\"    Images:   CT Abdomen Pelvis With Contrast    Result Date: 11/10/2024  Impression: 1. Left renal obstructive uropathy due to a 5 mm calculus located in the proximal left ureter. Electronically Signed: Moni Salas MD  11/10/2024 8:31 AM EST  Workstation ID: [id number]      Measures:   Tobacco:   Ria Dumont  reports that she quit smoking about 21 years ago. Her smoking use included cigarettes. She has been exposed to tobacco smoke. She has never used smokeless tobacco. I have educated her on the risk of diseases from using tobacco products.    Urine Incontinence: ( NOUI)  None     Assessment / Plan      Assessment/Plan:   Ria Dumont is a 68 y.o. female who presented today for kidney stones. She has 5 mm left proximal ureteral stone. She is asymptomatic today.    We discussed management options today.  It is not entirely sure if she has passed her stone or not given she is asymptomatic.  We discussed option of observation with repeat CT versus going to the OR for ureteroscopy.  There is " a chance that she may have passed her stone and there would be no findings on ureteroscopy.  There are chances of pain and infection with continued observation if stone is still present.  We discussed the risks and benefits of each option.  Given that she is asymptomatic, patient would like to repeat CT in a few weeks.  If stone still present on scan we will recommend ureteroscopy lithotripsy.  Risk of the procedure discussed.  She would be willing to proceed if necessary.    We discussed return precautions including fever/chills, intractable pain, and nausea/vomiting with inability for p.o. intake.  Recommended reaching out to the clinic if this were to happen.     We also discussed stone prevention strategies.     Diagnoses and all orders for this visit:    1. Left ureteral stone (Primary)  -     CT Abdomen Pelvis Stone Protocol; Future    2. Nephrolithiasis  -     POC Urinalysis Dipstick         Patient Education:   1. Fluid intake goal ~ 2.5L per day.  2. Avoid significant sodium and animal protein intake.     Follow Up:   Will call with CT results and determine next follow-up afterwards    I spent approximately 30 minutes providing clinical care for this patient; including review of patient's chart and provider documentation, face to face time spent with patient in examination room (obtaining history, performing physical exam, discussing diagnosis and management options), placing orders, and completing patient documentation.     Ghulam Muller MD  [facility] [place]

## 2024-12-09 ENCOUNTER — HOSPITAL ENCOUNTER (OUTPATIENT)
Dept: CT IMAGING | Facility: HOSPITAL | Age: 69
Discharge: HOME | End: 2024-12-09
Admitting: STUDENT IN AN ORGANIZED HEALTH CARE EDUCATION/TRAINING PROGRAM
Payer: MEDICARE

## 2024-12-09 DIAGNOSIS — N20.1 LEFT URETERAL STONE: ICD-10-CM

## 2024-12-09 PROCEDURE — 74176 CT ABD & PELVIS W/O CONTRAST: CPT

## 2024-12-10 NOTE — PROGRESS NOTES
Please let patient know she has passed her left ureteral stone on repeat CT scan.  I will see her back in 6 months for basic checkup

## 2025-01-28 ENCOUNTER — OFFICE VISIT (OUTPATIENT)
Dept: CARDIOLOGY | Facility: CLINIC | Age: 70
End: 2025-01-28
Payer: MEDICARE

## 2025-01-28 VITALS
HEART RATE: 57 BPM | OXYGEN SATURATION: 98 % | HEIGHT: 65 IN | BODY MASS INDEX: 24.03 KG/M2 | SYSTOLIC BLOOD PRESSURE: 132 MMHG | DIASTOLIC BLOOD PRESSURE: 78 MMHG | WEIGHT: 144.2 LBS

## 2025-01-28 DIAGNOSIS — I44.2 COMPLETE HEART BLOCK: Primary | Chronic | ICD-10-CM

## 2025-01-28 DIAGNOSIS — I47.19 ATRIAL TACHYCARDIA: Chronic | ICD-10-CM

## 2025-01-28 DIAGNOSIS — Z95.0 PRESENCE OF CARDIAC PACEMAKER: Chronic | ICD-10-CM

## 2025-01-28 DIAGNOSIS — I10 PRIMARY HYPERTENSION: Chronic | ICD-10-CM

## 2025-01-28 NOTE — PROGRESS NOTES
Cardiac Electrophysiology Outpatient Note  [facility]    Office Visit     Ria Dumont  [id number]  2025    Primary Care Physician: Altagracia Palomo MD    Referred By: No ref. provider found    Subjective     Chief Complaint   Patient presents with    Atrial tachycardia       History of Present Illness:   Ria Dumont is a 69 y.o. female who presents to my electrophysiology clinic for follow up of complete heart block s/p DC PPM and atrial tachycardia. Since we last saw the patient a year ago, she had no complaints.  She specifically denies chest pain, shortness breath, palpitations, lightheadedness/dizziness or syncopal episode.    Past Medical History:   Diagnosis Date    Complete heart block     Hyperlipidemia     Hypertension     Kidney stone 11/10/2024    Osteopetrosis     Spontaneous pneumothorax 1985    Remote Hx. S/P chest tube        Past Surgical History:   Procedure Laterality Date    ABLATION OF DYSRHYTHMIC FOCUS      INSERT / REPLACE / REMOVE PACEMAKER      PACEMAKER IMPLANTATION      PARTIAL HIP ARTHROPLASTY Left 10/07/2023    TUBAL ABDOMINAL LIGATION  1981       Family History   Problem Relation Age of Onset    Clotting disorder Mother             Asthma Father             Heart attack Sister             Hypertension Sister     Cancer Sister     Breast cancer Maternal Aunt 68    Cancer Maternal Grandfather         Not sure    Cancer Maternal Grandmother         Stomach?    BRCA 1/2 Neg Hx     Colon cancer Neg Hx     Endometrial cancer Neg Hx     Ovarian cancer Neg Hx        Social History     Socioeconomic History    Marital status:    Tobacco Use    Smoking status: Former     Current packs/day: 0.00     Types: Cigarettes     Quit date: 2003     Years since quittin.2     Passive exposure: Past    Smokeless tobacco: Never    Tobacco comments:     It’s been so long ago.   Vaping Use    Vaping  "status: Never Used   Substance and Sexual Activity    Alcohol use: No    Drug use: No    Sexual activity: Yes     Partners: Male     Birth control/protection: Tubal ligation         Current Outpatient Medications:     alendronate (FOSAMAX) 70 MG tablet, , Disp: , Rfl:     amLODIPine (NORVASC) 5 MG tablet, Take 1 tablet by mouth Daily., Disp: , Rfl:     aspirin 81 MG EC tablet, Take 1 tablet by mouth Daily., Disp: , Rfl:     atorvastatin (LIPITOR) 10 MG tablet, Take 1 tablet by mouth Daily., Disp: 90 tablet, Rfl: 1    rosuvastatin (CRESTOR) 10 MG tablet, , Disp: , Rfl:     tamsulosin (FLOMAX) 0.4 MG capsule 24 hr capsule, Take 1 capsule by mouth Daily. (Patient not taking: Reported on 1/28/2025), Disp: 30 capsule, Rfl: 0    Allergies:   Allergies   Allergen Reactions    Doxycycline Rash       Objective   Vital Signs: Blood pressure 132/78, pulse 57, height 165.1 cm (65\"), weight 65.4 kg (144 lb 3.2 oz), SpO2 98%, not currently breastfeeding.    PHYSICAL EXAM  General appearance: Awake, alert, cooperative  Head: Normocephalic, without obvious abnormality, atraumatic  Neck: No JVD  Lungs: Clear to ascultation bilaterally  Heart: Regular rate and rhythm, no murmurs, 2+ LE pulses, no lower extremity swelling  Skin: Skin color, turgor normal, no rashes or lesions  Neurologic: Grossly normal     Lab Results   Component Value Date    GLUCOSE 149 (H) 11/10/2024    CALCIUM 9.4 11/10/2024     11/10/2024    K 3.7 11/10/2024    CO2 23.0 11/10/2024     11/10/2024    BUN 19 11/10/2024    CREATININE 0.80 11/10/2024    EGFRIFAFRI 94 05/05/2020    BCR 23.8 11/10/2024    ANIONGAP 13.0 11/10/2024     Lab Results   Component Value Date    WBC 11.55 (H) 11/10/2024    HGB 11.9 (L) 11/10/2024    HCT 35.9 11/10/2024    MCV 79.6 11/10/2024     11/10/2024     No results found for: \"INR\", \"PROTIME\"  Lab Results   Component Value Date    TSH 3.860 05/05/2020          Results for orders placed during the hospital encounter of " 05/20/20    Adult Transthoracic Echo Complete W/ Cont if Necessary Per Protocol    Interpretation Summary  · Estimated EF appears to be in the range of 66 - 70%.  · There is a trivial pericardial effusion.         I personally viewed and interpreted the patient's EKG/Telemetry/lab data    Procedures    Ria Dumont  reports that she quit smoking about 21 years ago. Her smoking use included cigarettes. She has been exposed to tobacco smoke. She has never used smokeless tobacco.        Advance Care Planning   Advance Care Planning: ACP discussion was declined by the patient. Patient does not have an advance directive, information provided.     Assessment & Plan    1. Complete heart block  S/p DC PPM     2. Presence of cardiac pacemaker  Device interrogated functioning well with 3 and half years left on battery.  Full report detailed below:  BTK DC PPM, DDDR, rate 70, 43% AP, 99% , threshold impedance values acceptable, battery 3 years, underlying complete heart block, 7 short atrial tach episodes at about 165 bpm, one 4-second NSVT episode.  Patient reports no symptoms with these as far she is aware     3. Atrial tachycardia  2 brief episodes since last visit.  Patient denies any regular palpitations.  Continue to monitor.      4.  Essential hypertension  BP controlled the office today.  Continue current antihypertensive regimen.    Follow Up:  Return in about 1 year (around 1/28/2026).      Thank you for allowing me to participate in the care of your patient. Please do not hesitate to contact me with additional questions or concerns.      Geo Chaudhary PA-C  Cardiac Electrophysiology  [facility]

## 2025-04-03 ENCOUNTER — TRANSCRIBE ORDERS (OUTPATIENT)
Dept: ADMINISTRATIVE | Facility: HOSPITAL | Age: 70
End: 2025-04-03
Payer: MEDICARE

## 2025-04-03 DIAGNOSIS — Z12.31 OTHER SCREENING MAMMOGRAM: Primary | ICD-10-CM

## 2025-05-04 LAB
NCCN CRITERIA FLAG: NORMAL
TYRER CUZICK SCORE: 4.7

## 2025-05-19 ENCOUNTER — HOSPITAL ENCOUNTER (OUTPATIENT)
Dept: MAMMOGRAPHY | Facility: HOSPITAL | Age: 70
Discharge: HOME | End: 2025-05-19
Admitting: INTERNAL MEDICINE
Payer: MEDICARE

## 2025-05-19 DIAGNOSIS — Z12.31 OTHER SCREENING MAMMOGRAM: ICD-10-CM

## 2025-05-19 PROCEDURE — 77067 SCR MAMMO BI INCL CAD: CPT

## 2025-05-19 PROCEDURE — 77063 BREAST TOMOSYNTHESIS BI: CPT

## 2025-06-12 ENCOUNTER — OFFICE VISIT (OUTPATIENT)
Dept: UROLOGY | Facility: CLINIC | Age: 70
End: 2025-06-12
Payer: MEDICARE

## 2025-06-12 DIAGNOSIS — N20.0 LEFT NEPHROLITHIASIS: Primary | ICD-10-CM

## 2025-06-12 PROCEDURE — 1160F RVW MEDS BY RX/DR IN RCRD: CPT | Performed by: STUDENT IN AN ORGANIZED HEALTH CARE EDUCATION/TRAINING PROGRAM

## 2025-06-12 PROCEDURE — 99213 OFFICE O/P EST LOW 20 MIN: CPT | Performed by: STUDENT IN AN ORGANIZED HEALTH CARE EDUCATION/TRAINING PROGRAM

## 2025-06-12 PROCEDURE — 1159F MED LIST DOCD IN RCRD: CPT | Performed by: STUDENT IN AN ORGANIZED HEALTH CARE EDUCATION/TRAINING PROGRAM

## 2025-06-12 NOTE — PROGRESS NOTES
Follow Up Office Visit      Patient Name: Ria Dumont  : 1955   MRN: [id number]     Chief Complaint:    Chief Complaint   Patient presents with    Nephrolithiasis       Referring Provider: No ref. provider found    History of Present Illness: Ria Dumont is a 69 y.o. female who presents today for follow up of left effluxes.  Passed a left ureteral stone in 2024.  Here today for follow-up.  Denies flank pain hematuria or additional stone episodes.  Has not particularly focused on increasing her water intake.  She denies UTI, hematuria, flank pain since last evaluated.    Subjective      Review of System: Review of Systems   Genitourinary: Negative.  Negative for decreased urine volume, difficulty urinating, dysuria, enuresis, flank pain, frequency, hematuria and urgency.      I have reviewed the ROS documented by my clinical staff, I have updated appropriately and I agree. Ghulam Muller MD    I have reviewed and the following portions of the patient's history were updated as appropriate: past family history, past medical history, past social history, past surgical history and problem list.    Medications:     Current Outpatient Medications:     alendronate (FOSAMAX) 70 MG tablet, , Disp: , Rfl:     amLODIPine (NORVASC) 5 MG tablet, Take 1 tablet by mouth Daily., Disp: , Rfl:     aspirin 81 MG EC tablet, Take 1 tablet by mouth Daily., Disp: , Rfl:     rosuvastatin (CRESTOR) 10 MG tablet, , Disp: , Rfl:     Allergies:   Allergies   Allergen Reactions    Doxycycline Rash     Bladder & Bowel Symptom Questionnaire    How often do you usually urinate during the day ?   1 - About every 3-4 hours   2.   How many timed do you urinate at night?   0 - 0-1 time at night   3.   What is the reason that you usually urinate?   1 - Mild urge (can delay over an hour)   4.   Once you get the urge to go, how long can you     comfortably delay?   1 - 30-60 min   5.   How often do you get a  sudden urge that makes you rush to the bathroom?   2 - A few times a month   6.   How often does a sudden urge to urinate result in you leaking urine or wetting pads?   1 - Rarely   7.  In your opinion, how good is your bladder control?   2 - Good   8.  Do you have accidental bowel leakage?   no   9.  Do you have difficulty fully emptying your bladder?   no   10.  Do you experience accidental leakage when performing some physical activity such as coughing, sneezing, laughing or exercise?   no   11. Have you tried medications to help improve your symptoms?   no   12. Would you be interested in learning about a long-lasting option that may help you with your symptoms?   no                                                                             Total Score   8     0-7 (Mild) 8-16 (Moderate) 17-28 (Severe)          Objective     Physical Exam:   Vital Signs: There were no vitals filed for this visit.  There is no height or weight on file to calculate BMI.     Physical Exam  Constitutional:       Appearance: Normal appearance.   HENT:      Head: Normocephalic and atraumatic.      Nose: Nose normal.      Mouth/Throat:      Mouth: Mucous membranes are moist.      Pharynx: Oropharynx is clear.   Eyes:      Extraocular Movements: Extraocular movements intact.      Pupils: Pupils are equal, round, and reactive to light.   Pulmonary:      Effort: Pulmonary effort is normal. No respiratory distress.   Musculoskeletal:         General: No swelling or deformity. Normal range of motion.      Cervical back: Normal range of motion and neck supple.   Skin:     General: Skin is warm and dry.   Neurological:      General: No focal deficit present.      Mental Status: She is alert and oriented to person, place, and time. Mental status is at baseline.   Psychiatric:         Mood and Affect: Mood normal.         Behavior: Behavior normal.         Labs:   Brief Urine Lab Results  (Last result in the past 365 days)        Color    Clarity   Blood   Leuk Est   Nitrite   Protein   CREAT   Urine HCG        11/19/24 1207 Yellow   Clear   Negative   Negative   Negative   Negative                        Lab Results   Component Value Date    GLUCOSE 149 (H) 11/10/2024    CALCIUM 9.4 11/10/2024     11/10/2024    K 3.7 11/10/2024    CO2 23.0 11/10/2024     11/10/2024    BUN 19 11/10/2024    CREATININE 0.80 11/10/2024    EGFRIFAFRI 94 05/05/2020    BCR 23.8 11/10/2024    ANIONGAP 13.0 11/10/2024       Lab Results   Component Value Date    WBC 11.55 (H) 11/10/2024    HGB 11.9 (L) 11/10/2024    HCT 35.9 11/10/2024    MCV 79.6 11/10/2024     11/10/2024       Images:   Mammo Screening Digital Tomosynthesis Bilateral With CAD  Result Date: 5/20/2025  No findings suspicious for malignancy.  ACR BI-RADS CATEGORY:  1, NEGATIVE  RECOMMENDATION: Yearly mammogram, yearly clinical breast exam, and encourage self breast awareness.  CAD was used.  The standard false negative rate of mammography is between 10% and 25%. Complex patterns or increased breast density will markedly elevate the false negative rate of mammography.  A letter, in lay terminology, with the results of this exam will be mailed to the patient.   If there is a palpable area of concern, biopsy should be considered regardless of imaging findings.    5/20/2025 2:01 PM by Jagruti Godwin MD on Workstation: [id number]        Measures:   Tobacco:   Ria Dumont  reports that she quit smoking about 21 years ago. Her smoking use included cigarettes. She has been exposed to tobacco smoke. She has never used smokeless tobacco.     Urine Incontinence: ( NOUI)  Patient reports that she is not currently experiencing any symptoms of urinary incontinence.      Assessment / Plan      Assessment/Plan:   69 y.o. female who presented today for follow up of left nephrolithiasis.  Passed a ureteral stone in the winter.  Discussed kidney stone preventative strategies.  Educational packet provided.   Last CT scan performed December demonstrates a tiny residual 1 or 2 mm fragment in the left kidney.  She desires monitoring and we did discuss additional metabolic evaluation like a 24-hour urine test.  I will see her back in 1 year with KUB prior.    Diagnoses and all orders for this visit:    1. Left nephrolithiasis (Primary)  -     XR Abdomen KUB; Future           Follow Up:   Return in about 1 year (around 6/12/2026) for 1 year KUB prior , Follow Up after Imaging.    I spent approximately 20 minutes providing clinical care for this patient; including review of patient's chart and provider documentation, face to face time spent with patient in examination room (obtaining history, performing physical exam, discussing diagnosis and management options), placing orders, and completing patient documentation.     Ghulam Muller MD  [facility] [place]

## 2025-07-09 LAB
MDC_IDC_MSMT_BATTERY_REMAINING_PERCENTAGE: 35 %
MDC_IDC_MSMT_BATTERY_STATUS: NORMAL
MDC_IDC_MSMT_LEADCHNL_RA_DTM: NORMAL
MDC_IDC_MSMT_LEADCHNL_RA_IMPEDANCE_VALUE: 410
MDC_IDC_MSMT_LEADCHNL_RA_PACING_THRESHOLD_AMPLITUDE: 0.5
MDC_IDC_MSMT_LEADCHNL_RA_PACING_THRESHOLD_POLARITY: NORMAL
MDC_IDC_MSMT_LEADCHNL_RA_PACING_THRESHOLD_PULSEWIDTH: 0.4
MDC_IDC_MSMT_LEADCHNL_RA_SENSING_INTR_AMPL: 2.5
MDC_IDC_MSMT_LEADCHNL_RV_DTM: NORMAL
MDC_IDC_MSMT_LEADCHNL_RV_IMPEDANCE_VALUE: 507
MDC_IDC_MSMT_LEADCHNL_RV_PACING_THRESHOLD_AMPLITUDE: 0.9
MDC_IDC_MSMT_LEADCHNL_RV_PACING_THRESHOLD_POLARITY: NORMAL
MDC_IDC_MSMT_LEADCHNL_RV_PACING_THRESHOLD_PULSEWIDTH: 0.4
MDC_IDC_MSMT_LEADCHNL_RV_SENSING_INTR_AMPL: 7.5
MDC_IDC_PG_IMPLANT_DTM: NORMAL
MDC_IDC_PG_MFG: NORMAL
MDC_IDC_PG_MODEL: NORMAL
MDC_IDC_PG_SERIAL: NORMAL
MDC_IDC_PG_TYPE: NORMAL
MDC_IDC_SESS_DTM: NORMAL
MDC_IDC_SESS_TYPE: NORMAL
MDC_IDC_SET_BRADY_AT_MODE_SWITCH_RATE: 160
MDC_IDC_SET_BRADY_LOWRATE: 70
MDC_IDC_SET_BRADY_MAX_SENSOR_RATE: 120
MDC_IDC_SET_BRADY_MAX_TRACKING_RATE: 140
MDC_IDC_SET_BRADY_MODE: NORMAL
MDC_IDC_SET_BRADY_PAV_DELAY: 120
MDC_IDC_SET_BRADY_SAV_DELAY: 90
MDC_IDC_SET_LEADCHNL_RA_PACING_POLARITY: NORMAL
MDC_IDC_SET_LEADCHNL_RA_PACING_PULSEWIDTH: 0.4
MDC_IDC_SET_LEADCHNL_RA_SENSING_POLARITY: NORMAL
MDC_IDC_SET_LEADCHNL_RV_PACING_POLARITY: NORMAL
MDC_IDC_SET_LEADCHNL_RV_PACING_PULSEWIDTH: 0.4
MDC_IDC_SET_LEADCHNL_RV_SENSING_POLARITY: NORMAL
MDC_IDC_STAT_AT_BURDEN_PERCENT: 0
MDC_IDC_STAT_BRADY_RA_PERCENT_PACED: 44
MDC_IDC_STAT_BRADY_RV_PERCENT_PACED: 100

## 2025-08-04 LAB
MDC_IDC_MSMT_BATTERY_REMAINING_PERCENTAGE: 30 %
MDC_IDC_MSMT_BATTERY_STATUS: NORMAL
MDC_IDC_MSMT_LEADCHNL_RA_DTM: NORMAL
MDC_IDC_MSMT_LEADCHNL_RA_IMPEDANCE_VALUE: 429
MDC_IDC_MSMT_LEADCHNL_RA_PACING_THRESHOLD_AMPLITUDE: 0.6
MDC_IDC_MSMT_LEADCHNL_RA_PACING_THRESHOLD_POLARITY: NORMAL
MDC_IDC_MSMT_LEADCHNL_RA_PACING_THRESHOLD_PULSEWIDTH: 0.4
MDC_IDC_MSMT_LEADCHNL_RA_SENSING_INTR_AMPL: 2.3
MDC_IDC_MSMT_LEADCHNL_RV_DTM: NORMAL
MDC_IDC_MSMT_LEADCHNL_RV_IMPEDANCE_VALUE: 488
MDC_IDC_MSMT_LEADCHNL_RV_PACING_THRESHOLD_AMPLITUDE: 0.8
MDC_IDC_MSMT_LEADCHNL_RV_PACING_THRESHOLD_POLARITY: NORMAL
MDC_IDC_MSMT_LEADCHNL_RV_PACING_THRESHOLD_PULSEWIDTH: 0.4
MDC_IDC_MSMT_LEADCHNL_RV_SENSING_INTR_AMPL: 6.7
MDC_IDC_PG_IMPLANT_DTM: NORMAL
MDC_IDC_PG_MFG: NORMAL
MDC_IDC_PG_MODEL: NORMAL
MDC_IDC_PG_SERIAL: NORMAL
MDC_IDC_PG_TYPE: NORMAL
MDC_IDC_SESS_DTM: NORMAL
MDC_IDC_SESS_TYPE: NORMAL
MDC_IDC_SET_BRADY_AT_MODE_SWITCH_RATE: 160
MDC_IDC_SET_BRADY_LOWRATE: 70
MDC_IDC_SET_BRADY_MAX_SENSOR_RATE: 120
MDC_IDC_SET_BRADY_MAX_TRACKING_RATE: 140
MDC_IDC_SET_BRADY_MODE: NORMAL
MDC_IDC_SET_BRADY_PAV_DELAY: 120
MDC_IDC_SET_BRADY_SAV_DELAY: 90
MDC_IDC_SET_LEADCHNL_RA_PACING_POLARITY: NORMAL
MDC_IDC_SET_LEADCHNL_RA_PACING_PULSEWIDTH: 0.4
MDC_IDC_SET_LEADCHNL_RA_SENSING_POLARITY: NORMAL
MDC_IDC_SET_LEADCHNL_RV_PACING_POLARITY: NORMAL
MDC_IDC_SET_LEADCHNL_RV_PACING_PULSEWIDTH: 0.4
MDC_IDC_SET_LEADCHNL_RV_SENSING_POLARITY: NORMAL
MDC_IDC_STAT_AT_BURDEN_PERCENT: 0
MDC_IDC_STAT_BRADY_RA_PERCENT_PACED: 43
MDC_IDC_STAT_BRADY_RV_PERCENT_PACED: 100